# Patient Record
Sex: FEMALE | Race: OTHER | NOT HISPANIC OR LATINO | URBAN - METROPOLITAN AREA
[De-identification: names, ages, dates, MRNs, and addresses within clinical notes are randomized per-mention and may not be internally consistent; named-entity substitution may affect disease eponyms.]

---

## 2017-11-08 PROBLEM — Z00.00 ENCOUNTER FOR PREVENTIVE HEALTH EXAMINATION: Status: ACTIVE | Noted: 2017-11-08

## 2019-02-01 ENCOUNTER — OUTPATIENT (OUTPATIENT)
Dept: OUTPATIENT SERVICES | Facility: HOSPITAL | Age: 74
LOS: 1 days | End: 2019-02-01
Payer: COMMERCIAL

## 2019-02-01 ENCOUNTER — APPOINTMENT (OUTPATIENT)
Dept: ULTRASOUND IMAGING | Facility: HOSPITAL | Age: 74
End: 2019-02-01

## 2019-02-01 DIAGNOSIS — R10.11 RIGHT UPPER QUADRANT PAIN: ICD-10-CM

## 2019-02-01 PROCEDURE — 76700 US EXAM ABDOM COMPLETE: CPT

## 2019-02-01 PROCEDURE — 76700 US EXAM ABDOM COMPLETE: CPT | Mod: 26

## 2020-05-09 ENCOUNTER — TRANSCRIPTION ENCOUNTER (OUTPATIENT)
Age: 75
End: 2020-05-09

## 2021-02-20 ENCOUNTER — TRANSCRIPTION ENCOUNTER (OUTPATIENT)
Age: 76
End: 2021-02-20

## 2021-02-22 ENCOUNTER — FORM ENCOUNTER (OUTPATIENT)
Age: 76
End: 2021-02-22

## 2021-02-26 ENCOUNTER — EMERGENCY (EMERGENCY)
Facility: HOSPITAL | Age: 76
LOS: 1 days | Discharge: HOME | End: 2021-02-26
Attending: STUDENT IN AN ORGANIZED HEALTH CARE EDUCATION/TRAINING PROGRAM
Payer: COMMERCIAL

## 2021-02-26 VITALS
HEIGHT: 62 IN | OXYGEN SATURATION: 100 % | HEART RATE: 84 BPM | SYSTOLIC BLOOD PRESSURE: 122 MMHG | WEIGHT: 130.07 LBS | TEMPERATURE: 100 F | RESPIRATION RATE: 16 BRPM | DIASTOLIC BLOOD PRESSURE: 75 MMHG

## 2021-02-26 VITALS
RESPIRATION RATE: 16 BRPM | SYSTOLIC BLOOD PRESSURE: 105 MMHG | HEART RATE: 71 BPM | OXYGEN SATURATION: 95 % | TEMPERATURE: 100 F | DIASTOLIC BLOOD PRESSURE: 68 MMHG

## 2021-02-26 LAB
ALBUMIN SERPL ELPH-MCNC: 3.2 G/DL — LOW (ref 3.5–5)
ALP SERPL-CCNC: 47 U/L — SIGNIFICANT CHANGE UP (ref 40–120)
ALT FLD-CCNC: 23 U/L DA — SIGNIFICANT CHANGE UP (ref 10–60)
ANION GAP SERPL CALC-SCNC: 6 MMOL/L — SIGNIFICANT CHANGE UP (ref 5–17)
AST SERPL-CCNC: 16 U/L — SIGNIFICANT CHANGE UP (ref 10–40)
BASOPHILS # BLD AUTO: 0.01 K/UL — SIGNIFICANT CHANGE UP (ref 0–0.2)
BASOPHILS NFR BLD AUTO: 0.2 % — SIGNIFICANT CHANGE UP (ref 0–2)
BILIRUB SERPL-MCNC: 0.3 MG/DL — SIGNIFICANT CHANGE UP (ref 0.2–1.2)
BUN SERPL-MCNC: 6 MG/DL — LOW (ref 7–18)
CALCIUM SERPL-MCNC: 8.5 MG/DL — SIGNIFICANT CHANGE UP (ref 8.4–10.5)
CHLORIDE SERPL-SCNC: 102 MMOL/L — SIGNIFICANT CHANGE UP (ref 96–108)
CO2 SERPL-SCNC: 27 MMOL/L — SIGNIFICANT CHANGE UP (ref 22–31)
CREAT SERPL-MCNC: 0.54 MG/DL — SIGNIFICANT CHANGE UP (ref 0.5–1.3)
EOSINOPHIL # BLD AUTO: 0 K/UL — SIGNIFICANT CHANGE UP (ref 0–0.5)
EOSINOPHIL NFR BLD AUTO: 0 % — SIGNIFICANT CHANGE UP (ref 0–6)
GLUCOSE SERPL-MCNC: 92 MG/DL — SIGNIFICANT CHANGE UP (ref 70–99)
HCT VFR BLD CALC: 37.4 % — SIGNIFICANT CHANGE UP (ref 34.5–45)
HGB BLD-MCNC: 11.9 G/DL — SIGNIFICANT CHANGE UP (ref 11.5–15.5)
IMM GRANULOCYTES NFR BLD AUTO: 0.2 % — SIGNIFICANT CHANGE UP (ref 0–1.5)
LYMPHOCYTES # BLD AUTO: 1.02 K/UL — SIGNIFICANT CHANGE UP (ref 1–3.3)
LYMPHOCYTES # BLD AUTO: 18.5 % — SIGNIFICANT CHANGE UP (ref 13–44)
MAGNESIUM SERPL-MCNC: 2.2 MG/DL — SIGNIFICANT CHANGE UP (ref 1.6–2.6)
MCHC RBC-ENTMCNC: 27 PG — SIGNIFICANT CHANGE UP (ref 27–34)
MCHC RBC-ENTMCNC: 31.8 GM/DL — LOW (ref 32–36)
MCV RBC AUTO: 84.8 FL — SIGNIFICANT CHANGE UP (ref 80–100)
MONOCYTES # BLD AUTO: 0.36 K/UL — SIGNIFICANT CHANGE UP (ref 0–0.9)
MONOCYTES NFR BLD AUTO: 6.5 % — SIGNIFICANT CHANGE UP (ref 2–14)
NEUTROPHILS # BLD AUTO: 4.1 K/UL — SIGNIFICANT CHANGE UP (ref 1.8–7.4)
NEUTROPHILS NFR BLD AUTO: 74.6 % — SIGNIFICANT CHANGE UP (ref 43–77)
NRBC # BLD: 0 /100 WBCS — SIGNIFICANT CHANGE UP (ref 0–0)
PLATELET # BLD AUTO: 142 K/UL — LOW (ref 150–400)
POTASSIUM SERPL-MCNC: 3.7 MMOL/L — SIGNIFICANT CHANGE UP (ref 3.5–5.3)
POTASSIUM SERPL-SCNC: 3.7 MMOL/L — SIGNIFICANT CHANGE UP (ref 3.5–5.3)
PROT SERPL-MCNC: 6.7 G/DL — SIGNIFICANT CHANGE UP (ref 6–8.3)
RBC # BLD: 4.41 M/UL — SIGNIFICANT CHANGE UP (ref 3.8–5.2)
RBC # FLD: 14.1 % — SIGNIFICANT CHANGE UP (ref 10.3–14.5)
SODIUM SERPL-SCNC: 135 MMOL/L — SIGNIFICANT CHANGE UP (ref 135–145)
TROPONIN I SERPL-MCNC: <0.015 NG/ML — SIGNIFICANT CHANGE UP (ref 0–0.04)
WBC # BLD: 5.5 K/UL — SIGNIFICANT CHANGE UP (ref 3.8–10.5)
WBC # FLD AUTO: 5.5 K/UL — SIGNIFICANT CHANGE UP (ref 3.8–10.5)

## 2021-02-26 PROCEDURE — 96374 THER/PROPH/DIAG INJ IV PUSH: CPT

## 2021-02-26 PROCEDURE — 99284 EMERGENCY DEPT VISIT MOD MDM: CPT

## 2021-02-26 PROCEDURE — 80053 COMPREHEN METABOLIC PANEL: CPT

## 2021-02-26 PROCEDURE — 83735 ASSAY OF MAGNESIUM: CPT

## 2021-02-26 PROCEDURE — 96361 HYDRATE IV INFUSION ADD-ON: CPT

## 2021-02-26 PROCEDURE — 99284 EMERGENCY DEPT VISIT MOD MDM: CPT | Mod: 25

## 2021-02-26 PROCEDURE — 85025 COMPLETE CBC W/AUTO DIFF WBC: CPT

## 2021-02-26 PROCEDURE — 36415 COLL VENOUS BLD VENIPUNCTURE: CPT

## 2021-02-26 PROCEDURE — 84484 ASSAY OF TROPONIN QUANT: CPT

## 2021-02-26 RX ORDER — ONDANSETRON 8 MG/1
4 TABLET, FILM COATED ORAL ONCE
Refills: 0 | Status: COMPLETED | OUTPATIENT
Start: 2021-02-26 | End: 2021-02-26

## 2021-02-26 RX ORDER — ACETAMINOPHEN 500 MG
975 TABLET ORAL ONCE
Refills: 0 | Status: COMPLETED | OUTPATIENT
Start: 2021-02-26 | End: 2021-02-26

## 2021-02-26 RX ORDER — SODIUM CHLORIDE 9 MG/ML
1000 INJECTION INTRAMUSCULAR; INTRAVENOUS; SUBCUTANEOUS ONCE
Refills: 0 | Status: COMPLETED | OUTPATIENT
Start: 2021-02-26 | End: 2021-02-26

## 2021-02-26 RX ORDER — IBUPROFEN 200 MG
600 TABLET ORAL ONCE
Refills: 0 | Status: COMPLETED | OUTPATIENT
Start: 2021-02-26 | End: 2021-02-26

## 2021-02-26 RX ADMIN — Medication 600 MILLIGRAM(S): at 14:51

## 2021-02-26 RX ADMIN — ONDANSETRON 4 MILLIGRAM(S): 8 TABLET, FILM COATED ORAL at 13:18

## 2021-02-26 RX ADMIN — SODIUM CHLORIDE 1000 MILLILITER(S): 9 INJECTION INTRAMUSCULAR; INTRAVENOUS; SUBCUTANEOUS at 11:56

## 2021-02-26 RX ADMIN — SODIUM CHLORIDE 1000 MILLILITER(S): 9 INJECTION INTRAMUSCULAR; INTRAVENOUS; SUBCUTANEOUS at 13:00

## 2021-02-26 RX ADMIN — Medication 975 MILLIGRAM(S): at 11:56

## 2021-02-26 NOTE — ED PROVIDER NOTE - CLINICAL SUMMARY MEDICAL DECISION MAKING FREE TEXT BOX
75F presenting with fever, decreased PO and fatigue. no chest pain or shortness of breath. day 8 of covid symptoms. 96% at home and 94% on RA in ED. will get labs, symptom control. will reassess.

## 2021-02-26 NOTE — ED PROVIDER NOTE - CROS ED CARDIOVAS ALL NEG
Tammy,I feel bad I haven't caught up with you yet on [address] and thank you for meeting with her and father last week. Is there anything I can do further in your opinion? Thanks,Geovany negative...

## 2021-02-26 NOTE — ED ADULT TRIAGE NOTE - CHIEF COMPLAINT QUOTE
patient tested positive for COVID-19 and complaining of generalized weakness and nausea. patient was found by EMS with 94% RA and was placed on 4L NC. no sob no cp

## 2021-02-26 NOTE — ED ADULT NURSE NOTE - ED STAT RN HANDOFF DETAILS
discharged home, stable, in no acute distress. IV removed no bleeding, no redness noted. O2 sat 94% on room air

## 2021-02-26 NOTE — ED PROVIDER NOTE - PROGRESS NOTE DETAILS
patient and family updated on results. patient reports symptoms improved. left message for patients pcp. will discharge. Alfredo Mccloud

## 2021-02-26 NOTE — ED PROVIDER NOTE - PATIENT PORTAL LINK FT
You can access the FollowMyHealth Patient Portal offered by [facility] by registering at the following website: [email or web address]. By joining [facility]’s FollowMyHealth portal, you will also be able to view your health information using other applications (apps) compatible with our system.

## 2021-02-26 NOTE — ED ADULT NURSE NOTE - OBJECTIVE STATEMENT
Patient BIBA as per EMS 94% on room air. c/o generalized weakness, generalized pain with nausea. Patient tested positive for COVID-19 2/19. Patient denies chest pain, no sob on 4L nasal cannula. Patient reports feeling dizzy and falling down 2xs, with loss of consciousness for 2-3 minutes.

## 2021-02-26 NOTE — ED PROVIDER NOTE - NSFOLLOWUPINSTRUCTIONS_ED_ALL_ED_FT
You were seen in the emergency department for fever and fatigue likely caused by Covid-19.     Please follow-up with your primary care doctor in the next 24-48 hours.     Please continue to drink plenty of fluids.     Please take Tylenol and Ibuprofen as prescribed on the bottles for pain control.     If you have any worsening symptoms, severe chest pain, shortness of breath, headache, or oxygen level 91% or lower, please return to the emergency department.

## 2021-02-26 NOTE — ED PROVIDER NOTE - OBJECTIVE STATEMENT
75F, pmh of htn, hld, presenting with weakness. patient reports she was diagnosed with covid-19 and has had 8 days of symptoms. has fever, nausea, and fatigue. no chest pain, shortness of breathing, abdominal pain, pain or swelling of lower extremities. sating 96% at home.

## 2021-02-26 NOTE — ED PROVIDER NOTE - PHYSICAL EXAMINATION
General: uncomfortable appearing female, no acute distress   HEENT: normocephalic, atraumatic   Respiratory: normal work of breathing  MSK: no swelling or tenderness of lower extremities, moving all extremities spontaneously   Skin: warm, dry   Neuro: A&Ox3  Psych: appropriate affect

## 2021-03-13 ENCOUNTER — TRANSCRIPTION ENCOUNTER (OUTPATIENT)
Age: 76
End: 2021-03-13

## 2021-05-06 ENCOUNTER — TRANSCRIPTION ENCOUNTER (OUTPATIENT)
Age: 76
End: 2021-05-06

## 2021-05-18 NOTE — ED ADULT NURSE NOTE - NS TRANSFER PATIENT BELONGINGS
Patient seen and examined at bedside, resting comfortably. Pain well controlled. Denies headache/dizziness/lightheadedness, chest pain, dyspnea, n/v, numbness/tingling. No complaints at this time. No overnight events.    Vital Signs Last 24 Hrs  T(C): 36.6 (17 May 2021 19:44), Max: 36.7 (17 May 2021 07:45)  T(F): 97.9 (17 May 2021 19:44), Max: 98.1 (17 May 2021 07:45)  HR: 89 (17 May 2021 19:44) (83 - 89)  BP: 106/60 (17 May 2021 19:44) (106/60 - 108/78)  BP(mean): --  RR: 18 (17 May 2021 19:44) (18 - 18)  SpO2: 96% (17 May 2021 19:44) (96% - 98%)      PHYSICAL EXAM:  General: No acute distress, AAOx3    Left Lower Extremity:  Dressing clean dry intact  +EHL/FHL/TA/GS  SILT L3-S1  +DP/PT Pulses  Compartments soft  No calf TTP B/L  Clothing

## 2021-12-01 ENCOUNTER — TRANSCRIPTION ENCOUNTER (OUTPATIENT)
Age: 76
End: 2021-12-01

## 2022-01-11 ENCOUNTER — TRANSCRIPTION ENCOUNTER (OUTPATIENT)
Age: 77
End: 2022-01-11

## 2022-05-25 ENCOUNTER — NON-APPOINTMENT (OUTPATIENT)
Age: 77
End: 2022-05-25

## 2022-08-20 ENCOUNTER — INPATIENT (INPATIENT)
Facility: HOSPITAL | Age: 77
LOS: 1 days | Discharge: HOME | DRG: 313 | End: 2022-08-22
Attending: GENERAL PRACTICE | Admitting: GENERAL PRACTICE
Payer: COMMERCIAL

## 2022-08-20 VITALS
SYSTOLIC BLOOD PRESSURE: 119 MMHG | OXYGEN SATURATION: 98 % | RESPIRATION RATE: 16 BRPM | WEIGHT: 130.07 LBS | HEART RATE: 78 BPM | HEIGHT: 62 IN | TEMPERATURE: 98 F | DIASTOLIC BLOOD PRESSURE: 80 MMHG

## 2022-08-20 DIAGNOSIS — I25.2 OLD MYOCARDIAL INFARCTION: ICD-10-CM

## 2022-08-20 DIAGNOSIS — I10 ESSENTIAL (PRIMARY) HYPERTENSION: ICD-10-CM

## 2022-08-20 DIAGNOSIS — E89.2 POSTPROCEDURAL HYPOPARATHYROIDISM: Chronic | ICD-10-CM

## 2022-08-20 DIAGNOSIS — I24.9 ACUTE ISCHEMIC HEART DISEASE, UNSPECIFIED: ICD-10-CM

## 2022-08-20 DIAGNOSIS — E78.5 HYPERLIPIDEMIA, UNSPECIFIED: ICD-10-CM

## 2022-08-20 DIAGNOSIS — Z29.9 ENCOUNTER FOR PROPHYLACTIC MEASURES, UNSPECIFIED: ICD-10-CM

## 2022-08-20 DIAGNOSIS — R07.9 CHEST PAIN, UNSPECIFIED: ICD-10-CM

## 2022-08-20 LAB
ALBUMIN SERPL ELPH-MCNC: 3.6 G/DL — SIGNIFICANT CHANGE UP (ref 3.5–5)
ALP SERPL-CCNC: 57 U/L — SIGNIFICANT CHANGE UP (ref 40–120)
ALT FLD-CCNC: 21 U/L DA — SIGNIFICANT CHANGE UP (ref 10–60)
ANION GAP SERPL CALC-SCNC: 7 MMOL/L — SIGNIFICANT CHANGE UP (ref 5–17)
APTT BLD: 29.6 SEC — SIGNIFICANT CHANGE UP (ref 27.5–35.5)
AST SERPL-CCNC: 31 U/L — SIGNIFICANT CHANGE UP (ref 10–40)
BASOPHILS # BLD AUTO: 0.04 K/UL — SIGNIFICANT CHANGE UP (ref 0–0.2)
BASOPHILS NFR BLD AUTO: 0.7 % — SIGNIFICANT CHANGE UP (ref 0–2)
BILIRUB SERPL-MCNC: 0.4 MG/DL — SIGNIFICANT CHANGE UP (ref 0.2–1.2)
BUN SERPL-MCNC: 10 MG/DL — SIGNIFICANT CHANGE UP (ref 7–18)
CALCIUM SERPL-MCNC: 9 MG/DL — SIGNIFICANT CHANGE UP (ref 8.4–10.5)
CHLORIDE SERPL-SCNC: 108 MMOL/L — SIGNIFICANT CHANGE UP (ref 96–108)
CK SERPL-CCNC: 109 U/L — SIGNIFICANT CHANGE UP (ref 21–215)
CO2 SERPL-SCNC: 25 MMOL/L — SIGNIFICANT CHANGE UP (ref 22–31)
CREAT SERPL-MCNC: 0.62 MG/DL — SIGNIFICANT CHANGE UP (ref 0.5–1.3)
EGFR: 93 ML/MIN/1.73M2 — SIGNIFICANT CHANGE UP
EOSINOPHIL # BLD AUTO: 0.03 K/UL — SIGNIFICANT CHANGE UP (ref 0–0.5)
EOSINOPHIL NFR BLD AUTO: 0.5 % — SIGNIFICANT CHANGE UP (ref 0–6)
GLUCOSE SERPL-MCNC: 89 MG/DL — SIGNIFICANT CHANGE UP (ref 70–99)
HCT VFR BLD CALC: 40.6 % — SIGNIFICANT CHANGE UP (ref 34.5–45)
HGB BLD-MCNC: 12.9 G/DL — SIGNIFICANT CHANGE UP (ref 11.5–15.5)
IMM GRANULOCYTES NFR BLD AUTO: 0.3 % — SIGNIFICANT CHANGE UP (ref 0–1.5)
INR BLD: 0.97 RATIO — SIGNIFICANT CHANGE UP (ref 0.88–1.16)
LYMPHOCYTES # BLD AUTO: 1.12 K/UL — SIGNIFICANT CHANGE UP (ref 1–3.3)
LYMPHOCYTES # BLD AUTO: 18.9 % — SIGNIFICANT CHANGE UP (ref 13–44)
MCHC RBC-ENTMCNC: 28.4 PG — SIGNIFICANT CHANGE UP (ref 27–34)
MCHC RBC-ENTMCNC: 31.8 GM/DL — LOW (ref 32–36)
MCV RBC AUTO: 89.4 FL — SIGNIFICANT CHANGE UP (ref 80–100)
MONOCYTES # BLD AUTO: 0.3 K/UL — SIGNIFICANT CHANGE UP (ref 0–0.9)
MONOCYTES NFR BLD AUTO: 5.1 % — SIGNIFICANT CHANGE UP (ref 2–14)
NEUTROPHILS # BLD AUTO: 4.41 K/UL — SIGNIFICANT CHANGE UP (ref 1.8–7.4)
NEUTROPHILS NFR BLD AUTO: 74.5 % — SIGNIFICANT CHANGE UP (ref 43–77)
NRBC # BLD: 0 /100 WBCS — SIGNIFICANT CHANGE UP (ref 0–0)
NT-PROBNP SERPL-SCNC: 113 PG/ML — SIGNIFICANT CHANGE UP (ref 0–450)
PLATELET # BLD AUTO: 180 K/UL — SIGNIFICANT CHANGE UP (ref 150–400)
POTASSIUM SERPL-MCNC: 4.8 MMOL/L — SIGNIFICANT CHANGE UP (ref 3.5–5.3)
POTASSIUM SERPL-SCNC: 4.8 MMOL/L — SIGNIFICANT CHANGE UP (ref 3.5–5.3)
PROT SERPL-MCNC: 6.9 G/DL — SIGNIFICANT CHANGE UP (ref 6–8.3)
PROTHROM AB SERPL-ACNC: 11.5 SEC — SIGNIFICANT CHANGE UP (ref 10.5–13.4)
RBC # BLD: 4.54 M/UL — SIGNIFICANT CHANGE UP (ref 3.8–5.2)
RBC # FLD: 14 % — SIGNIFICANT CHANGE UP (ref 10.3–14.5)
SARS-COV-2 RNA SPEC QL NAA+PROBE: SIGNIFICANT CHANGE UP
SODIUM SERPL-SCNC: 140 MMOL/L — SIGNIFICANT CHANGE UP (ref 135–145)
TROPONIN I, HIGH SENSITIVITY RESULT: 4.1 NG/L — SIGNIFICANT CHANGE UP
TROPONIN I, HIGH SENSITIVITY RESULT: 4.2 NG/L — SIGNIFICANT CHANGE UP
WBC # BLD: 5.92 K/UL — SIGNIFICANT CHANGE UP (ref 3.8–10.5)
WBC # FLD AUTO: 5.92 K/UL — SIGNIFICANT CHANGE UP (ref 3.8–10.5)

## 2022-08-20 PROCEDURE — 99285 EMERGENCY DEPT VISIT HI MDM: CPT

## 2022-08-20 PROCEDURE — 71046 X-RAY EXAM CHEST 2 VIEWS: CPT | Mod: 26

## 2022-08-20 RX ORDER — METOPROLOL TARTRATE 50 MG
25 TABLET ORAL
Refills: 0 | Status: DISCONTINUED | OUTPATIENT
Start: 2022-08-20 | End: 2022-08-22

## 2022-08-20 RX ORDER — NEOMYCIN/POLYMYXIN B/DEXAMETHA 0.1 %
0 SUSPENSION, DROPS(FINAL DOSAGE FORM)(ML) OPHTHALMIC (EYE)
Qty: 0 | Refills: 0 | DISCHARGE

## 2022-08-20 RX ORDER — ATORVASTATIN CALCIUM 80 MG/1
0 TABLET, FILM COATED ORAL
Qty: 0 | Refills: 0 | DISCHARGE

## 2022-08-20 RX ORDER — LANOLIN ALCOHOL/MO/W.PET/CERES
3 CREAM (GRAM) TOPICAL AT BEDTIME
Refills: 0 | Status: DISCONTINUED | OUTPATIENT
Start: 2022-08-20 | End: 2022-08-22

## 2022-08-20 RX ORDER — NEBIVOLOL HYDROCHLORIDE 5 MG/1
0.5 TABLET ORAL
Qty: 0 | Refills: 0 | DISCHARGE

## 2022-08-20 RX ORDER — AMLODIPINE BESYLATE 2.5 MG/1
1 TABLET ORAL
Qty: 0 | Refills: 0 | DISCHARGE

## 2022-08-20 RX ORDER — ONDANSETRON 8 MG/1
4 TABLET, FILM COATED ORAL EVERY 8 HOURS
Refills: 0 | Status: DISCONTINUED | OUTPATIENT
Start: 2022-08-20 | End: 2022-08-22

## 2022-08-20 RX ORDER — AMLODIPINE BESYLATE 2.5 MG/1
0 TABLET ORAL
Qty: 0 | Refills: 0 | DISCHARGE

## 2022-08-20 RX ORDER — IBUPROFEN 200 MG
400 TABLET ORAL ONCE
Refills: 0 | Status: COMPLETED | OUTPATIENT
Start: 2022-08-20 | End: 2022-08-20

## 2022-08-20 RX ORDER — NEBIVOLOL HYDROCHLORIDE 5 MG/1
0 TABLET ORAL
Qty: 0 | Refills: 0 | DISCHARGE

## 2022-08-20 RX ORDER — ENOXAPARIN SODIUM 100 MG/ML
40 INJECTION SUBCUTANEOUS EVERY 24 HOURS
Refills: 0 | Status: DISCONTINUED | OUTPATIENT
Start: 2022-08-20 | End: 2022-08-22

## 2022-08-20 RX ORDER — ROSUVASTATIN CALCIUM 5 MG/1
1 TABLET ORAL
Qty: 0 | Refills: 0 | DISCHARGE

## 2022-08-20 RX ORDER — ROSUVASTATIN CALCIUM 5 MG/1
0 TABLET ORAL
Qty: 0 | Refills: 0 | DISCHARGE

## 2022-08-20 RX ORDER — AMLODIPINE BESYLATE 2.5 MG/1
2.5 TABLET ORAL DAILY
Refills: 0 | Status: DISCONTINUED | OUTPATIENT
Start: 2022-08-20 | End: 2022-08-22

## 2022-08-20 RX ORDER — ATORVASTATIN CALCIUM 80 MG/1
20 TABLET, FILM COATED ORAL AT BEDTIME
Refills: 0 | Status: DISCONTINUED | OUTPATIENT
Start: 2022-08-20 | End: 2022-08-22

## 2022-08-20 RX ORDER — ACETAMINOPHEN 500 MG
650 TABLET ORAL EVERY 6 HOURS
Refills: 0 | Status: DISCONTINUED | OUTPATIENT
Start: 2022-08-20 | End: 2022-08-22

## 2022-08-20 RX ORDER — ASPIRIN/CALCIUM CARB/MAGNESIUM 324 MG
81 TABLET ORAL DAILY
Refills: 0 | Status: DISCONTINUED | OUTPATIENT
Start: 2022-08-20 | End: 2022-08-22

## 2022-08-20 RX ORDER — ASPIRIN/CALCIUM CARB/MAGNESIUM 324 MG
81 TABLET ORAL ONCE
Refills: 0 | Status: COMPLETED | OUTPATIENT
Start: 2022-08-20 | End: 2022-08-20

## 2022-08-20 RX ADMIN — ATORVASTATIN CALCIUM 20 MILLIGRAM(S): 80 TABLET, FILM COATED ORAL at 21:50

## 2022-08-20 RX ADMIN — ENOXAPARIN SODIUM 40 MILLIGRAM(S): 100 INJECTION SUBCUTANEOUS at 16:45

## 2022-08-20 RX ADMIN — Medication 400 MILLIGRAM(S): at 16:02

## 2022-08-20 RX ADMIN — Medication 400 MILLIGRAM(S): at 16:46

## 2022-08-20 RX ADMIN — Medication 25 MILLIGRAM(S): at 17:31

## 2022-08-20 RX ADMIN — Medication 81 MILLIGRAM(S): at 17:30

## 2022-08-20 NOTE — ED PROVIDER NOTE - NS_SCRIBENAMERES_ED_ALL_ED_FT
Hospitalist Consultation Progress Note     Patient: Aditi Maddox Date: 5/15/2017   YOB: 1983 Admission Date: 5/12/2017   MRN: [id number] Attending: Lesly Mendez*     Chief Complaint: abdominal pain    SUBJECTIVE  Aditi Maddox is a 33 year old female who was admitted on 5/12/2017 for abdominal pain/fever. We are consulted for asymptomatic bradycardia  The patient had no acute overnight events. Pain controlled. No CP/SOB. No palpitations.    OBJECTIVE  Scheduled Medications   ertapenem 1 g Daily   sertraline 50 mg Nightly     Continuous Infusions   • sodium chloride 0.9% infusion 100 mL/hr at 05/14/17 0834       PHYSICAL EXAM  Vital signs:    Visit Vitals   • /70 ([place] [facility] [place] [facility]   • Pulse 58   • Temp 97.8 °F (36.6 °C) (Oral)   • Resp 16   • Ht 5' 3\" (1.6 m)   • Wt 67.9 kg   • LMP 07/10/2016 (Exact Date)   • SpO2 97%   • Breastfeeding Yes  Comment: pumping   • BMI 26.54 kg/m2       I/Os:    Intake/Output Summary (Last 24 hours) at 05/15/17 1136  Last data filed at 05/15/17 1000   Gross per 24 hour   Intake          1092.35 ml   Output             2600 ml   Net         -1507.65 ml       General:  Alert and cooperative, female.   Cardiovascular:  RRR, no murmur, no B/L LE edema  Respiratory:  CTAB, no increased work of breathing  Neurologic: EOMI. No gross motor or sensory deficits.  Psychiatric:  Appropriate mood/affect.    LAB RESULTS    Recent Labs  Lab 05/15/17  0558 05/13/17  0743   WBC 6.8 11.8*   HCT 37.9 37.4   HGB 12.7 12.0    193       Recent Labs  Lab 05/14/17  0700 05/12/17  1750   SODIUM 146* 138   POTASSIUM 4.0 3.8   CHLORIDE 116* 106   CO2 21 21   GLUCOSE 101* 86   BUN 7 11   CREATININE 0.54 0.61       IMAGING  Reviewed      ASSESSMENT/PLAN  Aidti Maddox is a 33 year old female who was admitted on 5/12/2017:     #Asymptomatic sinus bradycardia  As stated by my partner in consultation yesterday - EKG looks unremarkable, in  regards to QTc, OH interval. Agree that opiates are the main contributing factor to her bradycardia - these have been stopped. Review of the patient's historical heart rate at PCP office visits(2014) reveal that she runs in the 60s at baseline. This lady is young and in relatively decent shape - I am not surprised she has a slower resting HR. Recent heart rate recordings at recent hospitalizations are not accurate as she has had acute medical conditions during these. Recent outpatient notes do not have a HR recorded. Patient's HR will slow at night while she sleeps due to the vagal activity so I will not be surprised to see a HR in the 40's at night. I will check a TSH tomorrow morning and an echo. If these are unremarkable(which I suspect they will be) - there will be no further workup from my standpoint. I will see her tomorrow.    Epifanio Do, DO  Hospitalist    Please contact the attending hospitalist listed in EPIC caring for the patient until 8pm. From 8pm to 7:30 am (night) contact the hospitalist on call at [phone] for any patient care related issues.     Efe Locke)

## 2022-08-20 NOTE — H&P ADULT - ASSESSMENT
Patient is a 77F, with PMHx of HTN, HLD, MI in 2011, and parathyroidectomy in 2018, who comes in with 3 day history of mid chest pain radiating to her left shoulder and left arm. Admitted for ACS ruleout.

## 2022-08-20 NOTE — H&P ADULT - NSICDXPASTMEDICALHX_GEN_ALL_CORE_FT
PAST MEDICAL HISTORY:  History of myocardial infarction     HLD (hyperlipidemia)     HTN (hypertension)

## 2022-08-20 NOTE — ED PROVIDER NOTE - CLINICAL SUMMARY MEDICAL DECISION MAKING FREE TEXT BOX
Patient with chest pain radiating to the left shoulder blades, relieved with nitroglycerin. Will admit for cardiac arrest. Patient with chest pain radiating to the left shoulder blade, relieved with nitroglycerin. Will admit for cardiac workup, presentation concerning for unable angina, acute coronary syndrome.

## 2022-08-20 NOTE — H&P ADULT - PROBLEM SELECTOR PLAN 3
- Home meds Amlodipine 2.5mg, Bystolic 2.5mg  - Bystolic not in formulary -> will continue as equivalent dose = Metoprolol tartrate 25 BID

## 2022-08-20 NOTE — H&P ADULT - NSHPREVIEWOFSYSTEMS_GEN_ALL_CORE
CONSTITUTIONAL: No fever, weight loss, or fatigue  EYES: No eye pain, visual disturbances, or discharge  ENT:  No difficulty hearing, tinnitus, vertigo; No sinus or throat pain  NECK: No pain or stiffness  RESPIRATORY: No cough, wheezing, chills or hemoptysis; No Shortness of Breath  CARDIOVASCULAR: (+)Chest pain. No palpitations, passing out, dizziness, or leg swelling  GASTROINTESTINAL: No abdominal or epigastric pain. No nausea, vomiting, or hematemesis; No diarrhea or constipation. No melena or hematochezia.  GENITOURINARY: No dysuria, frequency, hematuria, or incontinence  NEUROLOGICAL: No memory loss, loss of strength, numbness, or tremors. (+)Headache  SKIN: No itching, burning, rashes, or lesions   LYMPH Nodes: No enlarged glands  ENDOCRINE: No heat or cold intolerance; No hair loss  MUSCULOSKELETAL: No joint pain or swelling; No muscle, back, No extremity pain  PSYCHIATRIC: No depression, anxiety, mood swings, or difficulty sleeping  HEME/LYMPH: No easy bruising, or bleeding gums  ALLERGY AND IMMUNOLOGIC: No hives or eczema

## 2022-08-20 NOTE — ED PROVIDER NOTE - OBJECTIVE STATEMENT
77 year olf female with history of hypertension, coming in with 3 days of substernal chest pain with radiation to the left shoulder. Patient states pain relieved with nitroglycerin. Patient had stress test a year ago. She is unsure of the result, but believes everything was normal. Patient denies cough and fever. EMS gave nitroglycerin spray. NKDA. 77 year olf female with history of hypertension, coming in with 3 days of substernal chest pressure with radiation to the left shoulder. Patient states pain relieved with nitroglycerin. Patient had stress test a year ago. She is unsure of the result, but believes everything was normal. Patient denies cough and fever. EMS gave nitroglycerin spray. NKDA.

## 2022-08-20 NOTE — ED ADULT NURSE NOTE - OBJECTIVE STATEMENT
Patient presents with c/o chest pain on/off x days, no SOB palpitation, dizziness ,back pain, cough noted.

## 2022-08-20 NOTE — ED PROVIDER NOTE - EKG #1 DATE/TIME
[facility] Hospitalist Progress Note    Subjective  mild febrile temperature 99.  Patient referred having nausea headache no double vision.    Objective   No chills or dysuria     I/O's    Intake/Output Summary (Last 24 hours) at 7/26/2021 1454  Last data filed at 7/26/2021 0659  Gross per 24 hour   Intake 1704.2 ml   Output 1200 ml   Net 504.2 ml       Last Recorded Vitals  Vitals with min/max:      Vital Last Value 24 Hour Range   Temperature 97.9 °F (36.6 °C) (07/26/21 1200) Temp  Min: 97.9 °F (36.6 °C)  Max: 99.3 °F (37.4 °C)   Pulse 93 (07/26/21 1100) Pulse  Min: 75  Max: 99   Respiratory (!) 27 (07/26/21 1100) Resp  Min: 20  Max: 29   Non-Invasive  Blood Pressure (!) 192/108 (07/26/21 1100) BP  Min: 136/84  Max: 192/108   Pulse Oximetry 98 % (07/26/21 1100) SpO2  Min: 92 %  Max: 99 %   Arterial   Blood Pressure   No data recorded      Body mass index is 33.27 kg/m².    Physical Exam:  GENERAL:  In no apparent distress  HEENT: normocephalic, atraumatic, pupils reactive to light, conjunctiva is normal. Ear lobes normal, nose normal, throat moist no masses  Neck: Supple no JVD, no thyroid palpable, no bruit  CHEST:  Symmetric on expansion  LUNGS:  with clear breath sounds bilaterally.   CARDIAC:  Regular rate and rhythm.  S1 and S2  ABDOMEN:  Soft, without detectable tenderness.  No sign of distention.  No   rebound or guarding, and no masses palpated.  EXTREMITIES::  Good range of motion of all major joints. no calf tenderness    VASCULAR:  No Edema.  Peripheral pulses normal and equal in all extremities  NEUROLOGIC: AAOX3 no acute distress, normal tone, normal sensitivity, motor force, reflex normal.      Labs     Recent Labs     07/24/21  0544 07/25/21  0259 07/26/21  0213   WBC 10.0 8.2 8.3   RBC 3.53* 3.52* 3.42*   HGB 10.6* 10.4* 10.1*   HCT 32.6* 33.2* 31.1*   * 116* 142   MCV 92.4 94.3 90.9   MCH 30.0 29.5 29.5   MCHC 32.5 31.3* 32.5   NRBCRE 0 0 0         Recent Labs     07/24/21  0544  07/25/21  0259 07/26/21  0213   SODIUM 143 143 145   POTASSIUM 4.1 3.9 3.8   MG 2.2 2.0 2.1   PHOS 1.7* 2.5 2.2*   CO2 22 22 21   ANIONGAP 10 10 14   GLUCOSE 100* 98 99   BUN 13 14 15   CREATININE 0.64 0.56 0.48*   BCRAT 20 25 31*   CALCIUM 8.1* 8.0* 8.1*        Recent Labs   Lab 07/21/21  1704   RAPDTR <0.02   NTPROB 152*        Recent Labs     07/23/21  2101 07/24/21  0544   INR 1.1 1.1   PT 12.4* 12.1*   PTT 22 22        Imaging  CT HEAD WO CONTRAST   Final Result by [facility], [facility] Incoming Radiant Results And Orders (07/25 0747)   1. Parenchymal hemorrhage with mild surrounding edema within the medial right frontal lobe appears similar in size and extent to the previous exam.    2. Mild intraventricular extension of hemorrhage into the right lateral ventricle appears similar to the previous exam. Blood within the left lateral ventricle is not visualized on the current exam.       Electronically Signed by: TERESA GIFFORD M.D.   Signed on: 07/25/2021 03:37 AM      XR ABDOMEN 1 VIEW   Final Result by Dylon Zhang MD (07/24 7460)   1.    NASOGASTRIC TUBE IN THE STOMACH IN GOOD POSITION   2.    LEFT BASILAR OPACITY AND/OR EFFUSION      Electronically Signed by: DYLON ZHANG MD    Signed on: 7/24/2021 4:50 PM          XR CHEST PA OR AP 1 VIEW   Final Result by Nakia Mejia DO (07/24 0905)   Findings/impression: 7:44 AM.  The ET tube is at the mid to lower trachea.    Enteric tube is seen below the hemidiaphragm.  Heart size is stable.    Vessels are mildly cephalized.  Patchy density within the left lung base   may reflect atelectasis.  No pneumothorax is seen.         Electronically Signed by: NAKIA MEJIA D.O.    Signed on: 7/24/2021 9:05 AM          CT HEAD WO CONTRAST   Final Result by Mesfin, [facility] Incoming Radiant Results And Orders (07/24 6154)   Unchanged large right periventricular/parietal parenchymal hemorrhage extending into the right lateral ventricle.       Electronically Signed by: EVELYN LEE M.D.   Signed  on: 07/24/2021 02:47 AM      CT HEAD WO CONTRAST   Final Result by Kely Black MD (07/23 1137)      Unchanged acute intraparenchymal hematoma in the posteromedial right   frontal lobe with intraventricular extension. Unchanged mild right lateral   ventricular dilatation.      The etiology of this spontaneous lobar hemorrhage is uncertain. MRI and   MRA/CTA may be considered for further evaluation.      Electronically Signed by: KELY BLACK MD    Signed on: 7/23/2021 11:37 AM          XR TUBE CHECK ABDOMEN KUB   Final Result by Bill Tucker MD (07/23 7225)   FINDINGS/IMPRESSION:      There is an orogastric tube, the distal end of which is identified in the   projection of the expected location of the gastric antrum.      There is an inferior vena caval filter.  There are degenerative changes of   the thoracolumbar spine.      There is no radiographic evidence of intra-abdominal free air, mass lesion,   free fluid or abnormal calcification.      The visualized intra-abdominal gas pattern appears otherwise unremarkable.      Incidentally noted is apparent left basilar subsegmental atelectasis.      Electronically Signed by: BILL TUCKER MD    Signed on: 7/23/2021 9:28 AM          XR CHEST PA OR AP 1 VIEW   Final Result by Bill Tucker MD (07/23 5095)   FINDINGS/IMPRESSION:      Since the examination of 07/21/2021, there has been the insertion of   endotracheal and nasogastric tubes, the distal ends of which are identified   in the projections of the mid to distal trachea and stomach, respectively.      Also noted has been the apparent interval development of left basilar   subsegmental atelectasis.      There is relative accentuation of the upper lobe pulmonary vascularity,   suggestive of an element of slight residual cardiac decompensation, volume   overload, etc., which appears unchanged.      There are degenerative changes of the thoracic spine.  The heart size is   normal.  There is tortuosity of the  thoracic aorta.      There is a suggestion of slight bilateral hyperaeration, which appears   unchanged.      There is no evidence of alveolar consolidation, pneumothorax or   demonstrable pulmonary parenchymal nodule.      Electronically Signed by: BILL TUCKER MD    Signed on: 7/23/2021 9:24 AM          CT HEAD LEVEL 1   Final Result by Dylon Vargas MD (07/23 0615)   Addendum 1 of 1 by Dylon Vargas MD (07/23 0615)   Findings were discussed with TERESA PALACIOS  at 7/23/2021 6:15 AM CDT      Electronically Signed by: DYLON VARGAS M.D.   Signed on: 07/23/2021 06:15 AM      Final   Large, acute parenchymal hemorrhage centered in the high right frontal lobe measuring up to 5.7 x 3.4 x 3.1 cm.  There is intraventricular extension of hemorrhage into the right lateral and 3rd ventricle.  Right lateral ventricle is mildly dilated.     Midline shift to the left of approximately 6 mm.        Electronically Signed by: DYLON VARGAS M.D.   Signed on: 07/23/2021 05:55 AM      Cath/PV Case   Final Result by Kerline Keller RN (07/22 1940)      FL GUIDANCE WITHOUT REPORT   Final Result by Cardiant Silent  (07/21 1939)      XR CHEST PA OR AP 1 VIEW   Final Result by Magdy Davis MD (07/21 7203)   Heart is borderline enlarged.  Mild central congestion.  Minimal perihilar   opacities.  Curvilinear opacities in the medial left lung base noted.    Correlate with CT chest.      Electronically Signed by: MAGDY DAVIS MD    Signed on: 7/21/2021 5:44 PM          CTA CHEST PULMONARY EMBOLISM W CONTRAST   Final Result by Rm Contreras MD (07/21 0057)      1.   No evidence of pulmonary embolism.      2.   Mild-to-moderate cardiomegaly.      3.   Round opacities in left lung base in a segment of the lung with no   apparent communication to the airways.  These findings may be related to   pulmonary sequestration.  Superimposed infection is not excluded.      4.   Small hiatal hernia.      Electronically Signed by:  20-Aug-2022 10:27 MERRY REED MD    Signed on: 7/21/2021 5:59 PM               Assessment/Plan  Ligia Ruano is a 59 year old female w/PMH Depression who last week underwent RFA of varicose veins of LLE (had RLE done a few months ago w/o complication) and had US on Monday normal but then on Tuesday felt pain and by Tuesday night had intense pain and called vascular who performed US and found extensive DVT and proceeded with Catheter TPA of LLE after which she is admitted to STICU    * Acute deep vein thrombosis (DVT) of femoral vein of left lower extremity (CMS/HCC)  Assessment & Plan  Acute deep vein thrombosis (DVT) of femoral vein of left lower extremity (CMS/HCC)   Sp IVC filter placement  Sp lysis 7/21   Sp LLE venogram 7/22   Was on tpa   Now off tpa due to ICH   dw vascular service  Pt agreed for dc on xarelto yesterday 7/22, cost confirmed w/ sw   7/24 off all blood thinners  7/25 remains off all blood thinners    Brain bleed (CMS/[facility])  Assessment & Plan  Large ICH w/ IVH overnight   CT head reviewed  nsg and neurology following  Stopped all anticoagulation   Repeat CT head today shows stable bleed   7/24 repeat CT head stable findings. nsg following no plan for EVD  More responsive to commands today   keppra for seizure ppx  7/25 repeat CT head reviewed stable bleed, no need for EVD  keppra for seizure ppx;   7.26 CT scan of the chest did not show increase in the bleeding.  Seems a stable.        Antinausea medications of blood pain medication when adjusted by trauma       Team/.  Patient to be transferred to regular floor today          Acute respiratory failure (CMS/[facility])  Assessment & Plan  Acute respiratory failure. Intubated for airway protection  Sedated w/ propfol and fentanyl  7/24 extubated to NC oxygen  Follows commands amanda w/ R side. LUe and LLE moves somewhat to command  Neurology following  7/25 now on room air  7.26 resolved with        Nicotine dependence  Assessment & Plan  Counseling given    Leg  edema, left  Assessment & Plan  LLE swelling/DVT d/t RFA (Radiofrequency Ablation) performed for varicose veins    Dysphagia  Assessment & Plan   failed swallow eval today  Plan for swallow eval in am     Iron deficiency anemia  Assessment & Plan  Hg 11   Monitor     Leukocytosis  Assessment & Plan  Reactive due to ICH    Depression  Assessment & Plan  Continue with antidepressants    Class 1 obesity due to excess calories in adult  Assessment & Plan  Monitor weight as outpatient  Sill discuss later     PLAN FOR TODAY   To have ct of brain today  Patient seems stable  To regular floor      Current Facility-Administered Medications   Medication Dose Route Frequency Provider Last Rate Last Admin   • amLODIPine (NORVASC) tablet 5 mg  5 mg Oral Daily Ryanne Phillip CNP   5 mg at 07/26/21 1054   • docusate sodium (COLACE) 50 MG/5ML liquid 100 mg  100 mg Oral Daily Ryanne Phillip CNP       • cyclobenzaprine (FLEXERIL) tablet 5 mg  5 mg Oral TID PRN Ryanne Phillip CNP       • enoxaparin (LOVENOX) injection 30 mg  30 mg Subcutaneous Q12H Ryanne Phillip CNP   30 mg at 07/26/21 0455   • ondansetron (ZOFRAN) injection 4 mg  4 mg Intravenous Q6H PRN Ryanne Phillip CNP   4 mg at 07/26/21 1246   • hydrALAZINE (APRESOLINE) injection 10 mg  10 mg Intravenous Q6H PRN Ryanne Phillip CNP   10 mg at 07/25/21 2230   • butalbital-APAP-caffeine (FIORICET) -40 MG tablet 1 tablet  1 tablet Oral Q4H PRN Ryanne Phillip CNP   1 tablet at 07/26/21 0552   • metoCLOPramide (REGLAN) injection 5 mg  5 mg Intravenous Q6H PRN Ryanne Phillip CNP   5 mg at 07/25/21 2312   • EPINEPHrine (ADRENALIN) injection 0.3 mg  0.3 mg Intramuscular PRN Ken Macias MD       • sodium chloride 0.9% infusion   Intravenous Continuous FIDE Dobson 100 mL/hr at 07/26/21 0659 Rate Verify at 07/26/21 0659   • levETIRAcetam (KepPRA INJECT) injection 500 mg  500 mg Intravenous 2 times per day Ryanne Phillip CNP   500 mg at 07/26/21 1055    • sodium chloride (PF) 0.9 % injection 2 mL  2 mL Intracatheter 2 times per day Jace Ceja MD   2 mL at 07/26/21 1119   • Potassium Standard Replacement Protocol   Does not apply See Admin Instructions Jace Ceja MD       • Magnesium Standard Replacement Protocol   Does not apply See Admin Instructions Jace Ceja MD       • Phosphorus Standard Replacement Protocol   Does not apply See Admin Instructions Jace Ceja MD       • acetaminophen (TYLENOL) tablet 650 mg  650 mg Oral Q6H PRN Jace Ceja MD   650 mg at 07/26/21 0551   • traMADol (ULTRAM) tablet 50 mg  50 mg Oral Q6H PRN Jace Ceja MD   50 mg at 07/22/21 0912   • docusate sodium-sennosides (SENOKOT S) 50-8.6 MG 2 tablet  2 tablet Oral Daily PRN Jace Ceja MD       • pravastatin (PRAVACHOL) tablet 10 mg  10 mg Oral Daily Jace Ceja MD   10 mg at 07/26/21 1055       DVT Prophylaxis  Current Active Medications for DVT Prophylaxis (From admission, onward)         Stop     enoxaparin (LOVENOX) injection 30 mg  30 mg,   Subcutaneous,   EVERY 12 HOURS      --                 IP CONSULT TO VASCULAR SURGERY  IP CONSULT TO HOSPITALIST  IP CONSULT TO SOCIAL WORK  IP CONSULT TO NEUROSURGERY  IP CONSULT TO PHYSICAL MEDICINE & REHAB  IP CONSULT TO SOCIAL WORK     Discussed with patient, nurse, consultants       Wendy Nunes MD  7/26/2021

## 2022-08-20 NOTE — H&P ADULT - NSHPPHYSICALEXAM_GEN_ALL_CORE
GENERAL: NAD, well-groomed, well-developed  HEAD:  Atraumatic, Normocephalic  EYES: EOMI, PERRLA, conjunctiva and sclera clear  ENMT: No tonsillar erythema, exudates, or enlargement; Moist mucous membranes, Good dentition, No lesions  NECK: Supple, normal appearance, No JVD; Normal thyroid; Trachea midline  NERVOUS SYSTEM:  Alert & Oriented X3,  Motor Strength 5/5 B/L upper and lower extremities, sensation intact  CHEST/LUNG: Lungs clear to auscultation bilaterally, No rales, rhonchi, wheezing   HEART: Regular rate and rhythm; No murmurs, rubs, or gallops  ABDOMEN: Soft, Nontender, Nondistended; Bowel sounds present  EXTREMITIES:  2+ Peripheral Pulses, No clubbing, cyanosis, or edema  LYMPH: No lymphadenopathy noted  SKIN: No rashes or lesions; Good capillary refill Height (cm): 157.5  Weight (kg): 59  BMI (kg/m2): 23.8  Vital Signs Last 24 HrsT(C): 36.6 (08-20-22 @ 16:34)  T(F): 97.8 (08-20-22 @ 16:34), Max: 97.8 (08-20-22 @ 16:34)  HR: 66 (08-20-22 @ 16:34) (66 - 78)  BP: 145/79 (08-20-22 @ 16:34)  RR: 17 (08-20-22 @ 16:34) (16 - 17)  SpO2: 97% (08-20-22 @ 16:34) (97% - 99%)      POCT Blood Glucose.: 92 mg/dL (20 Aug 2022 10:22)    GENERAL: NAD, well-groomed, well-developed  HEAD:  Atraumatic, Normocephalic  EYES: EOMI, PERRLA, conjunctiva and sclera clear  ENMT: No tonsillar erythema, exudates, or enlargement; Moist mucous membranes, Good dentition, No lesions  NECK: Supple, normal appearance, No JVD; Normal thyroid; Trachea midline  NERVOUS SYSTEM:  Alert & Oriented X3,  Motor Strength 5/5 B/L upper and lower extremities, sensation intact  CHEST/LUNG: Lungs clear to auscultation bilaterally, No rales, rhonchi, wheezing   HEART: Regular rate and rhythm; No murmurs, rubs, or gallops  ABDOMEN: Soft, Nontender, Nondistended; Bowel sounds present  EXTREMITIES:  2+ Peripheral Pulses, No clubbing, cyanosis, or edema  LYMPH: No lymphadenopathy noted  SKIN: No rashes or lesions; Good capillary refill

## 2022-08-20 NOTE — H&P ADULT - HISTORY OF PRESENT ILLNESS
Patient is a 77F, with PMHx of HTN, HLD, MI in 2011, and parathyroidectomy in 2018, who comes in with 3 day history of mid chest pain radiating to her left shoulder and left arm. Pain is sharp pain and comes with SOB, nausea, and dizziness. It was relieved by nitroglycerin. Currently, she only has mild chest pain. Patient endorses headache and denies any abdominal pain, joint pain, blood in urine, blood in stool.    Patient had last stress test and echo 1 year ago. She just remembers that her stress test was ok but echo had some abnormal result. Outpatient cardiologist is Dr. Jeff Butts.     Patient is a 77F, with PMHx of HTN, HLD, MI in 2011, and parathyroidectomy in 2018, who comes in with 3 day history of mid chest pain radiating to her left shoulder and left arm. Pain is sharp pain and comes with SOB, nausea, and dizziness. It was relieved by nitroglycerin. Currently, she only has mild chest pain. Patient endorses headache and denies any abdominal pain, joint pain, blood in urine, or blood in stool.    Patient had last stress test and echo 1 year ago. She just remembers that her stress test was ok but echo had some abnormal result. Outpatient cardiologist is Dr. Jeff Butts. Patient is a 77F, with PMHx of HTN, HLD, MI in 2011, and parathyroidectomy in 2018, who comes in with 3 day history of mid chest pain radiating to her left shoulder and left arm. Pain is sharp pain and comes with SOB, nausea, and dizziness. It was relieved by nitroglycerin.   Currently, she only has mild chest pain. Patient endorses headache and denies any abdominal pain, joint pain, blood in urine, or blood in stool.    Patient had last stress test and echo 1 year ago. She just remembers that her stress test was ok but echo had some abnormal result. Outpatient cardiologist is Dr. Jeff Butts.

## 2022-08-20 NOTE — H&P ADULT - PROBLEM SELECTOR PLAN 2
- Patient reports hx of MI in 2011  - Denies any stents or CABG  - Outpatient cardio is Dr. Jeff Butts

## 2022-08-20 NOTE — H&P ADULT - NSHPADDITIONALINFOADULT_GEN_ALL_CORE
Patient seen, examined, and interviewed by me, case discussed with me, chart reviewed, agree with above H/P as reviewed.  See  full note above.  Dr. FLACO Choudhury ([phone])

## 2022-08-21 ENCOUNTER — TRANSCRIPTION ENCOUNTER (OUTPATIENT)
Age: 77
End: 2022-08-21

## 2022-08-21 LAB
A1C WITH ESTIMATED AVERAGE GLUCOSE RESULT: 5.4 % — SIGNIFICANT CHANGE UP (ref 4–5.6)
ANION GAP SERPL CALC-SCNC: 13 MMOL/L — SIGNIFICANT CHANGE UP (ref 5–17)
BUN SERPL-MCNC: 10 MG/DL — SIGNIFICANT CHANGE UP (ref 7–18)
CALCIUM SERPL-MCNC: 9.1 MG/DL — SIGNIFICANT CHANGE UP (ref 8.4–10.5)
CHLORIDE SERPL-SCNC: 108 MMOL/L — SIGNIFICANT CHANGE UP (ref 96–108)
CHOLEST SERPL-MCNC: 191 MG/DL — SIGNIFICANT CHANGE UP
CO2 SERPL-SCNC: 22 MMOL/L — SIGNIFICANT CHANGE UP (ref 22–31)
CREAT SERPL-MCNC: 0.7 MG/DL — SIGNIFICANT CHANGE UP (ref 0.5–1.3)
D DIMER BLD IA.RAPID-MCNC: <150 NG/ML DDU — SIGNIFICANT CHANGE UP
EGFR: 90 ML/MIN/1.73M2 — SIGNIFICANT CHANGE UP
ESTIMATED AVERAGE GLUCOSE: 108 MG/DL — SIGNIFICANT CHANGE UP (ref 68–114)
GLUCOSE SERPL-MCNC: 81 MG/DL — SIGNIFICANT CHANGE UP (ref 70–99)
HCT VFR BLD CALC: 40.1 % — SIGNIFICANT CHANGE UP (ref 34.5–45)
HCV AB S/CO SERPL IA: 0.11 S/CO — SIGNIFICANT CHANGE UP (ref 0–0.99)
HCV AB SERPL-IMP: SIGNIFICANT CHANGE UP
HDLC SERPL-MCNC: 45 MG/DL — LOW
HGB BLD-MCNC: 12.8 G/DL — SIGNIFICANT CHANGE UP (ref 11.5–15.5)
LIPID PNL WITH DIRECT LDL SERPL: 105 MG/DL — HIGH
MAGNESIUM SERPL-MCNC: 2.3 MG/DL — SIGNIFICANT CHANGE UP (ref 1.6–2.6)
MCHC RBC-ENTMCNC: 27.8 PG — SIGNIFICANT CHANGE UP (ref 27–34)
MCHC RBC-ENTMCNC: 31.9 GM/DL — LOW (ref 32–36)
MCV RBC AUTO: 87.2 FL — SIGNIFICANT CHANGE UP (ref 80–100)
NON HDL CHOLESTEROL: 146 MG/DL — HIGH
NRBC # BLD: 0 /100 WBCS — SIGNIFICANT CHANGE UP (ref 0–0)
PHOSPHATE SERPL-MCNC: 3.8 MG/DL — SIGNIFICANT CHANGE UP (ref 2.5–4.5)
PLATELET # BLD AUTO: 196 K/UL — SIGNIFICANT CHANGE UP (ref 150–400)
POTASSIUM SERPL-MCNC: 3.9 MMOL/L — SIGNIFICANT CHANGE UP (ref 3.5–5.3)
POTASSIUM SERPL-SCNC: 3.9 MMOL/L — SIGNIFICANT CHANGE UP (ref 3.5–5.3)
RBC # BLD: 4.6 M/UL — SIGNIFICANT CHANGE UP (ref 3.8–5.2)
RBC # FLD: 14.2 % — SIGNIFICANT CHANGE UP (ref 10.3–14.5)
SODIUM SERPL-SCNC: 143 MMOL/L — SIGNIFICANT CHANGE UP (ref 135–145)
TRIGL SERPL-MCNC: 206 MG/DL — HIGH
WBC # BLD: 5.4 K/UL — SIGNIFICANT CHANGE UP (ref 3.8–10.5)
WBC # FLD AUTO: 5.4 K/UL — SIGNIFICANT CHANGE UP (ref 3.8–10.5)

## 2022-08-21 RX ORDER — PANTOPRAZOLE SODIUM 20 MG/1
40 TABLET, DELAYED RELEASE ORAL
Refills: 0 | Status: DISCONTINUED | OUTPATIENT
Start: 2022-08-21 | End: 2022-08-22

## 2022-08-21 RX ADMIN — AMLODIPINE BESYLATE 2.5 MILLIGRAM(S): 2.5 TABLET ORAL at 05:35

## 2022-08-21 RX ADMIN — Medication 25 MILLIGRAM(S): at 17:08

## 2022-08-21 RX ADMIN — Medication 25 MILLIGRAM(S): at 05:34

## 2022-08-21 RX ADMIN — Medication 81 MILLIGRAM(S): at 11:09

## 2022-08-21 RX ADMIN — ATORVASTATIN CALCIUM 20 MILLIGRAM(S): 80 TABLET, FILM COATED ORAL at 21:51

## 2022-08-21 NOTE — DISCHARGE NOTE PROVIDER - NSDCCAREPROVSEEN_GEN_ALL_CORE_FT
Macey, Isabella Morrison, Bran Givens, Nickyhao Isabella Choudhury  [facility]  Ordering Physician  Efe Locke      [ Greater than 35 min spent for discharge services.   FLACO Choudhury ]

## 2022-08-21 NOTE — DISCHARGE NOTE PROVIDER - HOSPITAL COURSE
77F, with PMHx of HTN, HLD, MI in 2011, and parathyroidectomy in 2018, who comes in with 3 day history of mid chest pain radiating to her left shoulder and left arm. Pain is sharp pain and comes with SOB, nausea, and dizziness. It was relieved by nitroglycerin. Currently, she only has mild chest pain. Patient endorses headache and denies any abdominal pain, joint pain, blood in urine, or blood in stool.  Patient had last stress test and echo 1 year ago. She just remembers that her stress test was ok but echo had some abnormal result. Outpatient cardiologist is Dr. Jeff Butts.  Admitted for ACS rule out. Trop x2 neg. No events on telemonitoring . ECHO showed.......Stress test showed....... 77F, with PMHx of HTN, HLD, MI in 2011, and parathyroidectomy in 2018, who comes in with 3 day history of mid chest pain radiating to her left shoulder and left arm. Pain is sharp pain and comes with SOB, nausea, and dizziness. It was relieved by nitroglycerin. Currently, she only has mild chest pain. Patient endorses headache and denies any abdominal pain, joint pain, blood in urine, or blood in stool.  Patient had last stress test and echo 1 year ago. She just remembers that her stress test was ok but echo had some abnormal result. Outpatient cardiologist is Dr. Jeff Butts.  Admitted for ACS rule out. Trop x2 neg. No events on telemonitoring . ECHO showed Normal ejection fraction with mild pulmonary hypertension. Stress test  was negative for ischemic changes.     Pt is stable for discharge. Pt has been advised to follow up as outpatient. Case has tiago discussed with the attending. This is just a summary of the case. For further information please refer to pt. chart document.

## 2022-08-21 NOTE — DISCHARGE NOTE PROVIDER - NSDCCPCAREPLAN_GEN_ALL_CORE_FT
PRINCIPAL DISCHARGE DIAGNOSIS  Diagnosis: ACS (acute coronary syndrome)  Assessment and Plan of Treatment: You presented to the hospital with complains of chest pain. You Cardiac workup including Troponins, Echocardiogram and Stress test was negative for ischemia (heart attack). You were started on Medication while in the hospital (aspirin, statin, betablocker). YOu will continue to take, aspirin, rosuvastatin, and bystolic. You should follow up with your PCP within 1 week of discharge for monitoring and medication adjustment.      SECONDARY DISCHARGE DIAGNOSES  Diagnosis: HTN (hypertension)  Assessment and Plan of Treatment: You have a history of hypertension and take Bystolic and amlodipine for it. Your blood pressure was controlled during your admission. You should continue to take your medications as prescribed. You should follow a DASH (Dietary Approaches to Stop Hypertension) diet. The DASH diet encourages you to reduce the sodium in your diet and eat a variety of foods rich in nutrients that help lower blood pressure, such as potassium, calcium and magnesium. You should try to excersise at least 150 minutes per week. You should follow up with your PCP within 1 week of discharge for monitoring and medication adjustment.    Diagnosis: HLD (hyperlipidemia)  Assessment and Plan of Treatment: You have a history of hyperlipidemia and take medication for it. You should continue to take Rosuvastatin as prescribed. You should follow a healthy diet low in saturated fats. You should try to excersise at least 150 minutes per week. You should follow up with your PCP within 1 week of discharge for monitoring and medication adjustment.

## 2022-08-21 NOTE — PROGRESS NOTE ADULT - PROBLEM SELECTOR PLAN 1
- Chest pain radiating to arm and shoulder, accompanied by SOB, nausea, and dizziness, relieved by nitroglycerin  - EKG = NSR  -Mild constant chest pain no radiation 8/21   -Started on PPI 8/21  - C/w home Bystolic as metoprolol for now. Started daily aspirin  - Trop negative x2  - F/u Echo  -f/u  stress test on Monday   - Cardio Dr. Morrison consulted

## 2022-08-21 NOTE — PROGRESS NOTE ADULT - SUBJECTIVE AND OBJECTIVE BOX
PGY-3 Progress Note discussed with attending    PAGER #: [[phone]] TILL 5:00 PM  PLEASE CONTACT ON CALL TEAM:  - On Call Team (Please refer to Arnel) FROM 5:00 PM - 8:30PM  - Nightfloat Team FROM 8:30 -7:30 AM      INTERVAL HPI/OVERNIGHT EVENTS: no event overnight. AxO3 . Complained of mild persistent chest pain, no events on tele. Trops neg      REVIEW OF SYSTEMS:  CONSTITUTIONAL: No fever, weight loss, or fatigue  RESPIRATORY: No cough, wheezing, chills or hemoptysis; No shortness of breath  CARDIOVASCULAR: No chest pain, palpitations, dizziness, or leg swelling  GASTROINTESTINAL: No abdominal pain. No nausea, vomiting, or hematemesis; No diarrhea or constipation. No melena or hematochezia.  GENITOURINARY: No dysuria or hematuria, urinary frequency  NEUROLOGICAL: No headaches, memory loss, loss of strength, numbness, or tremors  SKIN: No itching, burning, rashes, or lesions     Vital Signs Last 24 Hrs  T(C): 36.5 (21 Aug 2022 07:38), Max: 37.1 (20 Aug 2022 20:35)  T(F): 97.7 (21 Aug 2022 07:38), Max: 98.8 (20 Aug 2022 20:35)  HR: 53 (21 Aug 2022 07:38) (53 - 82)  BP: 119/77 (21 Aug 2022 07:38) (113/70 - 145/79)  BP(mean): --  RR: 18 (21 Aug 2022 07:38) (16 - 20)  SpO2: 97% (21 Aug 2022 07:38) (96% - 99%)    Parameters below as of 21 Aug 2022 07:38  Patient On (Oxygen Delivery Method): room air        PHYSICAL EXAMINATION:  GENERAL: NAD  HEAD:  Atraumatic, Normocephalic  EYES:  conjunctiva and sclera clear  NECK: Supple, No JVD, Normal thyroid  CHEST/LUNG: Clear to auscultation. Clear to percussion bilaterally; No rales, rhonchi, wheezing, or rubs  HEART: Regular rate and rhythm; No murmurs, rubs, or gallops  ABDOMEN: Soft, Nontender, Nondistended; Bowel sounds present  NERVOUS SYSTEM:  Alert & Oriented X3,    EXTREMITIES:  2+ Peripheral Pulses, No clubbing, cyanosis, or edema                            12.8   5.40  )-----------( 196      ( 21 Aug 2022 05:35 )             40.1     08-21    143  |  108  |  10  ----------------------------<  81  3.9   |  22  |  0.70    Ca    9.1      21 Aug 2022 05:35  Phos  3.8     08-21  Mg     2.3     08-21    TPro  6.9  /  Alb  3.6  /  TBili  0.4  /  DBili  x   /  AST  31  /  ALT  21  /  AlkPhos  57  08-20    LIVER FUNCTIONS - ( 20 Aug 2022 10:59 )  Alb: 3.6 g/dL / Pro: 6.9 g/dL / ALK PHOS: 57 U/L / ALT: 21 U/L DA / AST: 31 U/L / GGT: x           CARDIAC MARKERS ( 20 Aug 2022 10:59 )  x     / x     / 109 U/L / x     / x          PT/INR - ( 20 Aug 2022 10:59 )   PT: 11.5 sec;   INR: 0.97 ratio         PTT - ( 20 Aug 2022 10:59 )  PTT:29.6 sec    CAPILLARY BLOOD GLUCOSE      RADIOLOGY & ADDITIONAL TESTS:

## 2022-08-21 NOTE — DISCHARGE NOTE PROVIDER - NSDCMRMEDTOKEN_GEN_ALL_CORE_FT
amLODIPine 2.5 mg oral tablet: 1 tab(s) orally once a day  Bystolic 5 mg oral tablet: 0.5 tab(s) orally once a day  rosuvastatin 5 mg oral tablet: 1 tab(s) orally once a day   amLODIPine 2.5 mg oral tablet: 1 tab(s) orally once a day  aspirin 81 mg oral tablet, chewable: 1 tab(s) orally once a day  Bystolic 5 mg oral tablet: 0.5 tab(s) orally once a day  rosuvastatin 5 mg oral tablet: 1 tab(s) orally once a day

## 2022-08-21 NOTE — DISCHARGE NOTE PROVIDER - CARE PROVIDER_API CALL
Akanksha Guzman)  Geriatric Medicine; Internal Medicine  [id number] [place] [address]  Phone: [phone]  Fax: [phone]  Follow Up Time:     Elsi Morrison  CARDIOVASCULAR DISEASE  [address], [address]  [place] [address]  Phone: [phone]  Fax: [phone]  Follow Up Time:

## 2022-08-22 ENCOUNTER — TRANSCRIPTION ENCOUNTER (OUTPATIENT)
Age: 77
End: 2022-08-22

## 2022-08-22 VITALS
RESPIRATION RATE: 16 BRPM | HEART RATE: 75 BPM | DIASTOLIC BLOOD PRESSURE: 86 MMHG | SYSTOLIC BLOOD PRESSURE: 130 MMHG | OXYGEN SATURATION: 98 % | TEMPERATURE: 98 F

## 2022-08-22 LAB
ALBUMIN SERPL ELPH-MCNC: 3.5 G/DL — SIGNIFICANT CHANGE UP (ref 3.5–5)
ALP SERPL-CCNC: 60 U/L — SIGNIFICANT CHANGE UP (ref 40–120)
ALT FLD-CCNC: 19 U/L DA — SIGNIFICANT CHANGE UP (ref 10–60)
ANION GAP SERPL CALC-SCNC: 9 MMOL/L — SIGNIFICANT CHANGE UP (ref 5–17)
AST SERPL-CCNC: 13 U/L — SIGNIFICANT CHANGE UP (ref 10–40)
BILIRUB SERPL-MCNC: 0.4 MG/DL — SIGNIFICANT CHANGE UP (ref 0.2–1.2)
BUN SERPL-MCNC: 11 MG/DL — SIGNIFICANT CHANGE UP (ref 7–18)
CALCIUM SERPL-MCNC: 9.4 MG/DL — SIGNIFICANT CHANGE UP (ref 8.4–10.5)
CHLORIDE SERPL-SCNC: 108 MMOL/L — SIGNIFICANT CHANGE UP (ref 96–108)
CO2 SERPL-SCNC: 27 MMOL/L — SIGNIFICANT CHANGE UP (ref 22–31)
CREAT SERPL-MCNC: 0.65 MG/DL — SIGNIFICANT CHANGE UP (ref 0.5–1.3)
EGFR: 92 ML/MIN/1.73M2 — SIGNIFICANT CHANGE UP
GLUCOSE SERPL-MCNC: 94 MG/DL — SIGNIFICANT CHANGE UP (ref 70–99)
HCT VFR BLD CALC: 42.1 % — SIGNIFICANT CHANGE UP (ref 34.5–45)
HGB BLD-MCNC: 13.3 G/DL — SIGNIFICANT CHANGE UP (ref 11.5–15.5)
MCHC RBC-ENTMCNC: 28.1 PG — SIGNIFICANT CHANGE UP (ref 27–34)
MCHC RBC-ENTMCNC: 31.6 GM/DL — LOW (ref 32–36)
MCV RBC AUTO: 89 FL — SIGNIFICANT CHANGE UP (ref 80–100)
NRBC # BLD: 0 /100 WBCS — SIGNIFICANT CHANGE UP (ref 0–0)
PLATELET # BLD AUTO: 185 K/UL — SIGNIFICANT CHANGE UP (ref 150–400)
POTASSIUM SERPL-MCNC: 3.9 MMOL/L — SIGNIFICANT CHANGE UP (ref 3.5–5.3)
POTASSIUM SERPL-SCNC: 3.9 MMOL/L — SIGNIFICANT CHANGE UP (ref 3.5–5.3)
PROT SERPL-MCNC: 6.7 G/DL — SIGNIFICANT CHANGE UP (ref 6–8.3)
RBC # BLD: 4.73 M/UL — SIGNIFICANT CHANGE UP (ref 3.8–5.2)
RBC # FLD: 14 % — SIGNIFICANT CHANGE UP (ref 10.3–14.5)
SODIUM SERPL-SCNC: 144 MMOL/L — SIGNIFICANT CHANGE UP (ref 135–145)
WBC # BLD: 5.28 K/UL — SIGNIFICANT CHANGE UP (ref 3.8–10.5)
WBC # FLD AUTO: 5.28 K/UL — SIGNIFICANT CHANGE UP (ref 3.8–10.5)

## 2022-08-22 PROCEDURE — 84484 ASSAY OF TROPONIN QUANT: CPT

## 2022-08-22 PROCEDURE — 84100 ASSAY OF PHOSPHORUS: CPT

## 2022-08-22 PROCEDURE — 85730 THROMBOPLASTIN TIME PARTIAL: CPT

## 2022-08-22 PROCEDURE — 83880 ASSAY OF NATRIURETIC PEPTIDE: CPT

## 2022-08-22 PROCEDURE — 85027 COMPLETE CBC AUTOMATED: CPT

## 2022-08-22 PROCEDURE — 78452 HT MUSCLE IMAGE SPECT MULT: CPT | Mod: 26

## 2022-08-22 PROCEDURE — 86803 HEPATITIS C AB TEST: CPT

## 2022-08-22 PROCEDURE — 99285 EMERGENCY DEPT VISIT HI MDM: CPT

## 2022-08-22 PROCEDURE — 80061 LIPID PANEL: CPT

## 2022-08-22 PROCEDURE — 85610 PROTHROMBIN TIME: CPT

## 2022-08-22 PROCEDURE — 93017 CV STRESS TEST TRACING ONLY: CPT

## 2022-08-22 PROCEDURE — 80053 COMPREHEN METABOLIC PANEL: CPT

## 2022-08-22 PROCEDURE — 93005 ELECTROCARDIOGRAM TRACING: CPT

## 2022-08-22 PROCEDURE — 82550 ASSAY OF CK (CPK): CPT

## 2022-08-22 PROCEDURE — 71046 X-RAY EXAM CHEST 2 VIEWS: CPT

## 2022-08-22 PROCEDURE — 93018 CV STRESS TEST I&R ONLY: CPT

## 2022-08-22 PROCEDURE — 78452 HT MUSCLE IMAGE SPECT MULT: CPT

## 2022-08-22 PROCEDURE — 93306 TTE W/DOPPLER COMPLETE: CPT

## 2022-08-22 PROCEDURE — 85025 COMPLETE CBC W/AUTO DIFF WBC: CPT

## 2022-08-22 PROCEDURE — A9502: CPT

## 2022-08-22 PROCEDURE — 85379 FIBRIN DEGRADATION QUANT: CPT

## 2022-08-22 PROCEDURE — 93016 CV STRESS TEST SUPVJ ONLY: CPT

## 2022-08-22 PROCEDURE — 82962 GLUCOSE BLOOD TEST: CPT

## 2022-08-22 PROCEDURE — 36415 COLL VENOUS BLD VENIPUNCTURE: CPT

## 2022-08-22 PROCEDURE — 83036 HEMOGLOBIN GLYCOSYLATED A1C: CPT

## 2022-08-22 PROCEDURE — 87635 SARS-COV-2 COVID-19 AMP PRB: CPT

## 2022-08-22 PROCEDURE — 83735 ASSAY OF MAGNESIUM: CPT

## 2022-08-22 PROCEDURE — 80048 BASIC METABOLIC PNL TOTAL CA: CPT

## 2022-08-22 RX ORDER — ASPIRIN/CALCIUM CARB/MAGNESIUM 324 MG
1 TABLET ORAL
Qty: 30 | Refills: 0
Start: 2022-08-22 | End: 2022-09-20

## 2022-08-22 RX ADMIN — PANTOPRAZOLE SODIUM 40 MILLIGRAM(S): 20 TABLET, DELAYED RELEASE ORAL at 05:45

## 2022-08-22 RX ADMIN — Medication 81 MILLIGRAM(S): at 12:29

## 2022-08-22 RX ADMIN — AMLODIPINE BESYLATE 2.5 MILLIGRAM(S): 2.5 TABLET ORAL at 05:45

## 2022-08-22 RX ADMIN — Medication 25 MILLIGRAM(S): at 05:45

## 2022-08-22 NOTE — PROGRESS NOTE ADULT - SUBJECTIVE AND OBJECTIVE BOX
PGY-3 Progress Note discussed with attending    PAGER #: [[phone]] TILL 5:00 PM  PLEASE CONTACT ON CALL TEAM:  - On Call Team (Please refer to Arnel) FROM 5:00 PM - 8:30PM  - Nightfloat Team FROM 8:30 -7:30 AM    CHIEF COMPLAINT & BRIEF HOSPITAL COURSE:      INTERVAL HPI/OVERNIGHT EVENTS:       REVIEW OF SYSTEMS:  CONSTITUTIONAL: No fever, weight loss, or fatigue  RESPIRATORY: No cough, wheezing, chills or hemoptysis; No shortness of breath  CARDIOVASCULAR: No chest pain, palpitations, dizziness, or leg swelling  GASTROINTESTINAL: No abdominal pain. No nausea, vomiting, or hematemesis; No diarrhea or constipation. No melena or hematochezia.  GENITOURINARY: No dysuria or hematuria, urinary frequency  MUSCULOSKELETAL: No pain, no Limited ROM  NEUROLOGICAL: No headaches, memory loss, loss of strength, numbness, or tremors  SKIN: No itching, burning, rashes, or lesions     MEDICATIONS  (STANDING):  amLODIPine   Tablet 2.5 milliGRAM(s) Oral daily  aspirin  chewable 81 milliGRAM(s) Oral daily  atorvastatin 20 milliGRAM(s) Oral at bedtime  enoxaparin Injectable 40 milliGRAM(s) SubCutaneous every 24 hours  metoprolol tartrate 25 milliGRAM(s) Oral two times a day  pantoprazole    Tablet 40 milliGRAM(s) Oral before breakfast    MEDICATIONS  (PRN):  acetaminophen     Tablet .. 650 milliGRAM(s) Oral every 6 hours PRN Temp greater or equal to 38C (100.4F), Mild Pain (1 - 3), Moderate Pain (4 - 6)  aluminum hydroxide/magnesium hydroxide/simethicone Suspension 30 milliLiter(s) Oral every 4 hours PRN Dyspepsia  melatonin 3 milliGRAM(s) Oral at bedtime PRN Insomnia  ondansetron Injectable 4 milliGRAM(s) IV Push every 8 hours PRN Nausea and/or Vomiting      Vital Signs Last 24 Hrs  T(C): 36.5 (22 Aug 2022 07:24), Max: 37.2 (21 Aug 2022 20:24)  T(F): 97.7 (22 Aug 2022 07:24), Max: 98.9 (21 Aug 2022 20:24)  HR: 60 (22 Aug 2022 07:24) (56 - 61)  BP: 117/79 (22 Aug 2022 07:24) (108/71 - 121/64)  BP(mean): --  RR: 16 (22 Aug 2022 07:24) (16 - 18)  SpO2: 98% (22 Aug 2022 07:24) (96% - 98%)    Parameters below as of 22 Aug 2022 07:24  Patient On (Oxygen Delivery Method): room air        PHYSICAL EXAMINATION:  GENERAL: NAD, well built  HEAD:  Atraumatic, Normocephalic  EYES:  conjunctiva and sclera clear, no scleral icterus  NECK: Supple, No JVD, Normal thyroid  CHEST/LUNG: Clear to auscultation.  No rales, rhonchi, wheezing, or rubs  HEART: Regular rate and rhythm; No murmurs, rubs, or gallops  ABDOMEN: Soft, Nontender, Nondistended; Bowel sounds present  NERVOUS SYSTEM:  Alert & Oriented X3,  Strength 5/5 in upper and lower extremities, sensation intact  EXTREMITIES:  2+ Peripheral Pulses, No clubbing, cyanosis, or edema  SKIN: warm dry, no lesions noted                          13.3   5.28  )-----------( 185      ( 22 Aug 2022 05:45 )             42.1     08-22    144  |  108  |  11  ----------------------------<  94  3.9   |  27  |  0.65    Ca    9.4      22 Aug 2022 05:45  Phos  3.8     08-21  Mg     2.3     08-21    TPro  6.7  /  Alb  3.5  /  TBili  0.4  /  DBili  x   /  AST  13  /  ALT  19  /  AlkPhos  60  08-22    LIVER FUNCTIONS - ( 22 Aug 2022 05:45 )  Alb: 3.5 g/dL / Pro: 6.7 g/dL / ALK PHOS: 60 U/L / ALT: 19 U/L DA / AST: 13 U/L / GGT: x           CARDIAC MARKERS ( 20 Aug 2022 10:59 )  x     / x     / 109 U/L / x     / x          PT/INR - ( 20 Aug 2022 10:59 )   PT: 11.5 sec;   INR: 0.97 ratio         PTT - ( 20 Aug 2022 10:59 )  PTT:29.6 sec  I&O's Summary        RADIOLOGY & ADDITIONAL TESTS:                   PGY-3 Progress Note discussed with attending    PAGER #: [[phone]] TILL 5:00 PM  PLEASE CONTACT ON CALL TEAM:  - On Call Team (Please refer to Arnel) FROM 5:00 PM - 8:30PM  - Nightfloat Team FROM 8:30 -7:30 AM      INTERVAL HPI/OVERNIGHT EVENTS:   Patient seen and examined at bedside. No events overnight. Patient going for stress test today. Denies any complains.     REVIEW OF SYSTEMS:  CONSTITUTIONAL: No fever, weight loss, or fatigue  RESPIRATORY: No cough, wheezing, chills or hemoptysis; No shortness of breath  CARDIOVASCULAR: No chest pain, palpitations, dizziness, or leg swelling  GASTROINTESTINAL: No abdominal pain. No nausea, vomiting, or hematemesis; No diarrhea or constipation. No melena or hematochezia.  GENITOURINARY: No dysuria or hematuria, urinary frequency  MUSCULOSKELETAL: No pain, no Limited ROM  NEUROLOGICAL: No headaches, memory loss, loss of strength, numbness, or tremors  SKIN: No itching, burning, rashes, or lesions     MEDICATIONS  (STANDING):  amLODIPine   Tablet 2.5 milliGRAM(s) Oral daily  aspirin  chewable 81 milliGRAM(s) Oral daily  atorvastatin 20 milliGRAM(s) Oral at bedtime  enoxaparin Injectable 40 milliGRAM(s) SubCutaneous every 24 hours  metoprolol tartrate 25 milliGRAM(s) Oral two times a day  pantoprazole    Tablet 40 milliGRAM(s) Oral before breakfast    MEDICATIONS  (PRN):  acetaminophen     Tablet .. 650 milliGRAM(s) Oral every 6 hours PRN Temp greater or equal to 38C (100.4F), Mild Pain (1 - 3), Moderate Pain (4 - 6)  aluminum hydroxide/magnesium hydroxide/simethicone Suspension 30 milliLiter(s) Oral every 4 hours PRN Dyspepsia  melatonin 3 milliGRAM(s) Oral at bedtime PRN Insomnia  ondansetron Injectable 4 milliGRAM(s) IV Push every 8 hours PRN Nausea and/or Vomiting      Vital Signs Last 24 Hrs  T(C): 36.5 (22 Aug 2022 07:24), Max: 37.2 (21 Aug 2022 20:24)  T(F): 97.7 (22 Aug 2022 07:24), Max: 98.9 (21 Aug 2022 20:24)  HR: 60 (22 Aug 2022 07:24) (56 - 61)  BP: 117/79 (22 Aug 2022 07:24) (108/71 - 121/64)  BP(mean): --  RR: 16 (22 Aug 2022 07:24) (16 - 18)  SpO2: 98% (22 Aug 2022 07:24) (96% - 98%)    Parameters below as of 22 Aug 2022 07:24  Patient On (Oxygen Delivery Method): room air        PHYSICAL EXAMINATION:  GENERAL: NAD, well built  HEAD:  Atraumatic, Normocephalic  EYES:  conjunctiva and sclera clear, no scleral icterus  NECK: Supple, No JVD, Normal thyroid  CHEST/LUNG: Clear to auscultation.  No rales, rhonchi, wheezing, or rubs  HEART: Regular rate and rhythm; No murmurs, rubs, or gallops  ABDOMEN: Soft, Nontender, Nondistended; Bowel sounds present  NERVOUS SYSTEM:  Alert & Oriented X3,  Strength 5/5 in upper and lower extremities, sensation intact  EXTREMITIES:  2+ Peripheral Pulses, No clubbing, cyanosis, or edema  SKIN: warm dry, no lesions noted                          13.3   5.28  )-----------( 185      ( 22 Aug 2022 05:45 )             42.1     08-22    144  |  108  |  11  ----------------------------<  94  3.9   |  27  |  0.65    Ca    9.4      22 Aug 2022 05:45  Phos  3.8     08-21  Mg     2.3     08-21    TPro  6.7  /  Alb  3.5  /  TBili  0.4  /  DBili  x   /  AST  13  /  ALT  19  /  AlkPhos  60  08-22    LIVER FUNCTIONS - ( 22 Aug 2022 05:45 )  Alb: 3.5 g/dL / Pro: 6.7 g/dL / ALK PHOS: 60 U/L / ALT: 19 U/L DA / AST: 13 U/L / GGT: x           CARDIAC MARKERS ( 20 Aug 2022 10:59 )  x     / x     / 109 U/L / x     / x          PT/INR - ( 20 Aug 2022 10:59 )   PT: 11.5 sec;   INR: 0.97 ratio         PTT - ( 20 Aug 2022 10:59 )  PTT:29.6 sec  I&O's Summary        RADIOLOGY & ADDITIONAL TESTS:

## 2022-08-22 NOTE — PROGRESS NOTE ADULT - ASSESSMENT
M E D I C A L   A T T E N D I N G    F O L L O W    U P   N O T E  (08-22-22 )                                     ------------------------------------------------------------------------------------------------    patient evaluated by me, case discussed with team, chart, medications, and physical exam reviewed, labs / tests  and vitals reviewed by me, as bellow.   Patient is stable for discharge today. pt overall doing well, chest pain free ... presenting symptoms likely non cardiac and from musculoskeletal pain as discussed with pt..   Patient to follow up with  PMD  See discharge document for full note.  Greater than 35 min spent for these services.                              ( note written / Date of service  08-22-22 )    ==================>> MEDICATIONS <<====================    amLODIPine   Tablet 2.5 milliGRAM(s) Oral daily  aspirin  chewable 81 milliGRAM(s) Oral daily  atorvastatin 20 milliGRAM(s) Oral at bedtime  enoxaparin Injectable 40 milliGRAM(s) SubCutaneous every 24 hours  metoprolol tartrate 25 milliGRAM(s) Oral two times a day  pantoprazole    Tablet 40 milliGRAM(s) Oral before breakfast    MEDICATIONS  (PRN):  acetaminophen     Tablet .. 650 milliGRAM(s) Oral every 6 hours PRN Temp greater or equal to 38C (100.4F), Mild Pain (1 - 3), Moderate Pain (4 - 6)  aluminum hydroxide/magnesium hydroxide/simethicone Suspension 30 milliLiter(s) Oral every 4 hours PRN Dyspepsia  melatonin 3 milliGRAM(s) Oral at bedtime PRN Insomnia  ondansetron Injectable 4 milliGRAM(s) IV Push every 8 hours PRN Nausea and/or Vomiting    ___________  Active diet:  Diet, Regular:   DASH/TLC Sodium & Cholesterol Restricted  ___________________    ==================>> VITAL SIGNS <<==================    T(C): 36.7 (08-22-22 @ 11:14), Max: 37.2 (08-21-22 @ 20:24)  HR: 75 (08-22-22 @ 11:14) (56 - 75)  BP: 130/86 (08-22-22 @ 11:14) (114/73 - 130/86)  BP(mean): --  RR: 16 (08-22-22 @ 11:14) (16 - 16)  SpO2: 98% (08-22-22 @ 11:14) (96% - 98%)     I&O's Summary    22 Aug 2022 07:01  -  22 Aug 2022 14:17  --------------------------------------------------------  IN: 230 mL / OUT: 0 mL / NET: 230 mL       ==================>> LAB AND IMAGING <<==================                        13.3   5.28  )-----------( 185      ( 22 Aug 2022 05:45 )             42.1        08-22    144  |  108  |  11  ----------------------------<  94  3.9   |  27  |  0.65    Ca    9.4      22 Aug 2022 05:45  Phos  3.8     08-21  Mg     2.3     08-21    TPro  6.7  /  Alb  3.5  /  TBili  0.4  /  DBili  x   /  AST  13  /  ALT  19  /  AlkPhos  60  08-22      Lipid profile:  (08-21-22)     Total: 191     LDL  : (p)     HDL  :45     TG   :206     HgA1C:   (08-21-22)          (08-21-22)      5.4  WBC count:   5.28 <<== ,  5.40 <<== ,  5.92 <<==   Hemoglobin:   13.3 <<==,  12.8 <<==,  12.9 <<==  platelets:  185 <==, 196 <==, 180 <==    Creatinine:  0.65  <<==, 0.70  <<==, 0.62  <<==  Sodium:   144  <==, 143  <==, 140  <==       AST:          13 <== , 31 <==      ALT:        19  <== , 21  <==      AP:        60  <=, 57  <=     Bili:        0.4  <=, 0.4  <=     < from: Transthoracic Echocardiogram (08.21.22 @ 06:39) >  CONCLUSIONS:  1. Trace mitral regurgitation.  2. Normal left ventricular internal dimensions and wall  thicknesses.  3. Endocardium not well visualized; grossly normal left  ventricular systolic function.  4. Normal right ventricular size and function.  5. RV systolic pressure is 41 mm Hg. Mild pulmonary  hypertension.    < end of copied text >      < from: Nuclear Stress Test-Pharmacologic (08.22.22 @ 05:17) >  IMPRESSIONS:  * There is a small, predominantly fixed, mild intensity  defect in the mid to basal anteroseptalwall that  thickens, most consistent with attenuation artifact.  * Post-stress resting myocardial perfusion gated SPECT  imaging was performed (LVEF > 70%;LVEDV = 63 ml.)  * Negative study for significant reversible ischemia    < end of copied text >    
  _________________________________________________________________________________________  ========>>  M E D I C A L   A T T E N D I N G    F O L L O W  U P  N O T E  <<=========  -----------------------------------------------------------------------------------------------------    - Patient seen and examined by me approximately sixty minutes ago.  - In summary,  MARIS JEFFERS is a 77y year old woman admitted with CP  - Patient today overall doing ok, comfortable, eating OK.   still with some pain in left chest, mild     ==================>> REVIEW OF SYSTEM <<=================    GEN: no fever, no chills, as above ,, otherwise doing ok   RESP: no SOB, no cough, no sputum  CVS: chest pain as above , no palpitations, no edema  GI: no abdominal pain, no nausea, denies acid reflux   : no dysuria, no frequency, no hematuria  Neuro: no headache, no dizziness  Derm : no itching, no rash    ==================>> PHYSICAL EXAM <<=================    GEN: A&O X 3 , NAD , comfortable, pleasant, calm in chair   HEENT: NCAT, PERRL, MMM, hearing intact  Neck: supple , no JVD appreciated  CVS: S1S2 , regular , No M/R/G appreciated  PULM: CTA B/L,  no W/R/R appreciated  ABD.: soft. non tender, non distended,  bowel sounds present  Extrem: intact pulses , no edema   PSYCH : normal mood,  not anxious                            ( Note Written / Date of service :  08-21-22 )    ==================>> MEDICATIONS <<====================    MEDICATIONS  (STANDING):  amLODIPine   Tablet 2.5 milliGRAM(s) Oral daily  aspirin  chewable 81 milliGRAM(s) Oral daily  atorvastatin 20 milliGRAM(s) Oral at bedtime  enoxaparin Injectable 40 milliGRAM(s) SubCutaneous every 24 hours  metoprolol tartrate 25 milliGRAM(s) Oral two times a day  pantoprazole    Tablet 40 milliGRAM(s) Oral before breakfast    MEDICATIONS  (PRN):  acetaminophen     Tablet .. 650 milliGRAM(s) Oral every 6 hours PRN Temp greater or equal to 38C (100.4F), Mild Pain (1 - 3), Moderate Pain (4 - 6)  aluminum hydroxide/magnesium hydroxide/simethicone Suspension 30 milliLiter(s) Oral every 4 hours PRN Dyspepsia  melatonin 3 milliGRAM(s) Oral at bedtime PRN Insomnia  ondansetron Injectable 4 milliGRAM(s) IV Push every 8 hours PRN Nausea and/or Vomiting    ___________  Active diet:  Diet, Regular:   DASH/TLC Sodium & Cholesterol Restricted  ___________________    ==================>> VITAL SIGNS <<==================    T(C): 36.9 (08-21-22 @ 11:12), Max: 37.1 (08-20-22 @ 20:35)  HR: 58 (08-21-22 @ 11:12) (53 - 82)  BP: 108/71 (08-21-22 @ 11:12) (108/71 - 145/79)  RR: 18 (08-21-22 @ 11:12) (16 - 20)  SpO2: 96% (08-21-22 @ 11:12) (96% - 99%)      ==================>> LAB AND IMAGING <<==================                        12.8   5.40  )-----------( 196      ( 21 Aug 2022 05:35 )             40.1        08-21    143  |  108  |  10  ----------------------------<  81  3.9   |  22  |  0.70    Ca    9.1      21 Aug 2022 05:35  Phos  3.8     08-21  Mg     2.3     08-21    TPro  6.9  /  Alb  3.6  /  TBili  0.4  /  DBili  x   /  AST  31  /  ALT  21  /  AlkPhos  57  08-20    PT/INR - ( 20 Aug 2022 10:59 )   PT: 11.5 sec;   INR: 0.97 ratio    PTT - ( 20 Aug 2022 10:59 )  PTT:29.6 sec              ~~ High Sensitivity Troponin  ~~  4.2  <<--, 4.1  <<--    Pro-BNP: 113 (08-20-22 @ 10:59)       Lipid profile:  (08-21-22)     Total: 191     LDL  : (p)     HDL  :45     TG   :206     pending Echo  ___________________________________________________________________________________  ===============>>  A S S E S S M E N T   A N D   P L A N <<===============  ------------------------------------------------------------------------------------------    · Assessment	  Patient is a 77F, with PMHx of HTN, HLD, MI in 2011, and parathyroidectomy in 2018, who comes in with 3 day history of mid chest pain radiating to her left shoulder and left arm. Admitted for ACS ruleout.    Problem/Plan - 1:  ·  Problem: Chest pain.   ·  Plan: - Chest pain radiating to arm and shoulder, accompanied by SOB, nausea, and dizziness, relieved by nitroglycerin  - C/w home Bystolic as metoprolol for now. Start daily aspirin  - Trop negative x1, f/u trop 2  - F/u Echo, stress test  - Cardio Dr. Morrison consulted.    Problem/Plan - 2:  ·  Problem: History of myocardial infarction.   ·  Plan: - Patient reports hx of MI in 2011  - Denies any stents or CABG  - Outpatient cardio is Dr. Jeff Butts.    Problem/Plan - 3:  ·  Problem: HTN (hypertension).   ·  Plan: - Home meds Amlodipine 2.5mg, Bystolic 2.5mg  - Bystolic not in formulary -> will continue as equivalent dose = Metoprolol tartrate 25 BID.    Problem/Plan - 4:  ·  Problem: HLD (hyperlipidemia).   ·  Plan: - Home med Rosuvastatin 5mg  - C/w home med  - F/u lipid panel, A1c.    Problem/Plan - 5:  ·  Problem: Prophylactic measure.   ·  Plan: - Lovenox.    --------------------------------------------  Case discussed with pt, HS  Education given on findings and plan of care  ___________________________  HRohit Choudhury D.O.  Pager: [phone]    
Patient is a 77F, with PMHx of HTN, HLD, MI in 2011, and parathyroidectomy in 2018, who comes in with 3 day history of mid chest pain radiating to her left shoulder and left arm. Admitted for ACS ruleout.
Patient is a 77F, with PMHx of HTN, HLD, MI in 2011, and parathyroidectomy in 2018, who comes in with 3 day history of mid chest pain radiating to her left shoulder and left arm. Admitted for ACS ruleout.

## 2022-08-22 NOTE — PROGRESS NOTE ADULT - PROBLEM SELECTOR PLAN 4
- Home med Rosuvastatin 5mg  - C/w home med  -   -[id number] - Home med Rosuvastatin 5mg  - C/w home med  -   -hgbA1C 5.4

## 2022-08-22 NOTE — PROGRESS NOTE ADULT - PROBLEM SELECTOR PLAN 1
- Chest pain radiating to arm and shoulder, accompanied by SOB, nausea, and dizziness, relieved by nitroglycerin  - EKG = NSR  -Mild constant chest pain no radiation 8/21   -Started on PPI 8/21  - C/w home Bystolic as metoprolol for now. Started daily aspirin  - Trop negative x2  - F/u Echo  -f/u  stress test on Monday   - Cardio Dr. Morrison consulted - Chest pain radiating to arm and shoulder, accompanied by SOB, nausea, and dizziness, relieved by nitroglycerin  - EKG = NSR  -Mild constant chest pain no radiation 8/21   -Started on PPI 8/21  - C/w home Bystolic as metoprolol for now. Started daily aspirin  - Trop negative x2  - F/u Echo  -f/u  stress test 8/22  - Cardio Dr. Morrison consulted

## 2022-08-22 NOTE — PROGRESS NOTE ADULT - PROBLEM SELECTOR PLAN 2
- Patient reports hx of MI in 2011  - Denies any stents or CABG  - Outpatient cardio is Dr. Jeff Butts
- Patient reports hx of MI in 2011  - Denies any stents or CABG  - Outpatient cardio is Dr. Jeff Butts

## 2022-08-22 NOTE — PROGRESS NOTE ADULT - PROBLEM SELECTOR PLAN 3
- Home meds Amlodipine 2.5mg, Bystolic 2.5mg  - Bystolic not in formulary -> will continue as equivalent dose = Metoprolol tartrate 25 BID
- Home meds Amlodipine 2.5mg, Bystolic 2.5mg  - Bystolic not in formulary -> will continue as equivalent dose = Metoprolol tartrate 25 BID

## 2022-08-22 NOTE — DISCHARGE NOTE NURSING/CASE MANAGEMENT/SOCIAL WORK - PATIENT PORTAL LINK FT
You can access the FollowMyHealth Patient Portal offered by [facility] by registering at the following website: [email or web address]. By joining [facility]’s FollowMyHealth portal, you will also be able to view your health information using other applications (apps) compatible with our system.

## 2022-12-03 ENCOUNTER — NON-APPOINTMENT (OUTPATIENT)
Age: 77
End: 2022-12-03

## 2023-01-03 ENCOUNTER — APPOINTMENT (OUTPATIENT)
Dept: OTOLARYNGOLOGY | Facility: CLINIC | Age: 78
End: 2023-01-03
Payer: COMMERCIAL

## 2023-01-03 DIAGNOSIS — J34.2 DEVIATED NASAL SEPTUM: ICD-10-CM

## 2023-01-03 PROBLEM — I25.2 OLD MYOCARDIAL INFARCTION: Chronic | Status: ACTIVE | Noted: 2022-08-20

## 2023-01-03 PROBLEM — E78.5 HYPERLIPIDEMIA, UNSPECIFIED: Chronic | Status: ACTIVE | Noted: 2022-08-20

## 2023-01-03 PROBLEM — I10 ESSENTIAL (PRIMARY) HYPERTENSION: Chronic | Status: ACTIVE | Noted: 2022-08-20

## 2023-01-03 PROCEDURE — 92557 COMPREHENSIVE HEARING TEST: CPT

## 2023-01-03 PROCEDURE — 31231 NASAL ENDOSCOPY DX: CPT

## 2023-01-03 PROCEDURE — 92567 TYMPANOMETRY: CPT

## 2023-01-03 PROCEDURE — 99204 OFFICE O/P NEW MOD 45 MIN: CPT | Mod: 25

## 2023-01-03 NOTE — HISTORY OF PRESENT ILLNESS
[de-identified] : 10-11 days fullness pain right ear\par no d/c \par no vertigo or tinnitus\par never had before \par + nasal congestion

## 2023-01-03 NOTE — PHYSICAL EXAM
[Midline] : trachea located in midline position [Normal] : no rashes [de-identified] : some thickening of the TM on the right, decreasesed but still + mobility

## 2023-01-03 NOTE — ASSESSMENT
[FreeTextEntry1] : pt with right hearing loss - CHL with B tymp and decrease TM mobility right \par some decrease \par audio test today 1/3/2023\par Discussed with pt she can try decongestants to see if this helps with ear pressure. If decongestants do not provide relief, patient can try low dose steroids. If sx persist, discussed procedure intervention to relieve pressure\par will start on afrin and sudafed. if these do not provide releif will begin medrol \par

## 2023-01-03 NOTE — PROCEDURE
[FreeTextEntry6] : Procedure performed: Nasal Endoscopy- Diagnostic\par Pre-op indication(s): nasal congestion\par Post-op indication(s): nasal congestion \par Verbal and/or written consent obtained from patient\par Anterior rhinoscopy insufficient to account for symptoms\par Scope #: 3,  flexible fiber optic telescope \par The scope was introduced in the nasal passage between the middle and inferior turbinates to exam the inferior portion of the middle meatus and the fontanelle, as well as the maxillary ostia.  Next, the scope was passed medically and posteriorly to the middle turbinates to examine the sphenoethmoid recess and the superior turbinate region.\par Upon visualization the finders are as follows:\par Nasal Septum: left septal deviation\par Bilateral - Mucosa: boggy turbinates, Mucous: scant, Polyp: not seen, Inferior Turbinate: boggy, Middle Turbinate: normal, Superior Turbinate: normal, Inferior Meatus: narrow, Middle Meatus: narrow, Super Meatus:normal, Sphenoethmoidal Recess: clear\par clear NP

## 2023-01-24 ENCOUNTER — APPOINTMENT (OUTPATIENT)
Dept: OTOLARYNGOLOGY | Facility: CLINIC | Age: 78
End: 2023-01-24
Payer: COMMERCIAL

## 2023-01-24 VITALS
DIASTOLIC BLOOD PRESSURE: 86 MMHG | BODY MASS INDEX: 23.19 KG/M2 | HEIGHT: 62 IN | WEIGHT: 126 LBS | HEART RATE: 83 BPM | SYSTOLIC BLOOD PRESSURE: 149 MMHG

## 2023-01-24 DIAGNOSIS — H69.81 OTHER SPECIFIED DISORDERS OF EUSTACHIAN TUBE, RIGHT EAR: ICD-10-CM

## 2023-01-24 DIAGNOSIS — H90.A11 CONDUCTIVE HEARING LOSS, UNILATERAL, RIGHT EAR WITH RESTRICTED HEARING ON THE CONTRALATERAL SIDE: ICD-10-CM

## 2023-01-24 PROCEDURE — 92557 COMPREHENSIVE HEARING TEST: CPT

## 2023-01-24 PROCEDURE — 99214 OFFICE O/P EST MOD 30 MIN: CPT | Mod: 25

## 2023-01-24 PROCEDURE — 92550 TYMPANOMETRY & REFLEX THRESH: CPT

## 2023-01-24 RX ORDER — NEBIVOLOL HYDROCHLORIDE 20 MG/1
TABLET ORAL
Refills: 0 | Status: ACTIVE | COMMUNITY

## 2023-01-24 RX ORDER — AMLODIPINE BESYLATE 5 MG/1
TABLET ORAL
Refills: 0 | Status: ACTIVE | COMMUNITY

## 2023-01-24 RX ORDER — METHYLPREDNISOLONE 4 MG/1
4 TABLET ORAL
Qty: 1 | Refills: 0 | Status: COMPLETED | COMMUNITY
Start: 2023-01-03 | End: 2023-01-24

## 2023-01-24 RX ORDER — CHROMIUM 200 MCG
TABLET ORAL
Refills: 0 | Status: ACTIVE | COMMUNITY

## 2023-01-24 NOTE — HISTORY OF PRESENT ILLNESS
[de-identified] : 77 year old female with history of ear fullness, right otalgia and hearing loss presents for follow up. \par States completed medrol dose pack as prescribed.\par States intermittent fullness and hearing not improved with occasional dizziness. \par Denies otalgia, otorrhea, tinnitus, headaches related to hearing.

## 2023-01-24 NOTE — REASON FOR VISIT
[Subsequent Evaluation] : a subsequent evaluation for [Spouse] : spouse [FreeTextEntry2] : ear fullness and hearing loss.

## 2023-01-24 NOTE — ASSESSMENT
[FreeTextEntry1] : 76 y/o F pt with right hearing loss - CHL with B tymp and decrease TM mobility right last visit - looks improved on exam today but pt still feels hearing not great. got repeat audio to see if tube would benefit her, gap has pretty much closed and has a A tymp c/w exam\par discussed options of hearing treatment at this point pt and  feel not significant limitation in function, discuses what to look for in regards to signs of worsening hearing loss that may require re-evaluation. Also discussed behavioral techniques and environmental controls for improving function . They will follow up as needed or if hearing gets worse.\par

## 2023-11-10 NOTE — PATIENT PROFILE ADULT - NSPRESCRALCFREQ_GEN_A_NUR
Detail Level: Detailed
Quality 110: Preventive Care And Screening: Influenza Immunization: Influenza Immunization Administered during Influenza season
Quality 130: Documentation Of Current Medications In The Medical Record: Current Medications Documented
Never

## 2025-07-02 ENCOUNTER — APPOINTMENT (OUTPATIENT)
Facility: CLINIC | Age: 80
End: 2025-07-02

## 2025-07-02 VITALS
HEART RATE: 73 BPM | HEIGHT: 62 IN | OXYGEN SATURATION: 97 % | BODY MASS INDEX: 23.19 KG/M2 | DIASTOLIC BLOOD PRESSURE: 90 MMHG | SYSTOLIC BLOOD PRESSURE: 141 MMHG | WEIGHT: 126 LBS

## 2025-07-02 PROCEDURE — 99203 OFFICE O/P NEW LOW 30 MIN: CPT

## 2025-07-02 RX ORDER — NAPROXEN 500 MG/1
500 TABLET ORAL
Qty: 30 | Refills: 0 | Status: ACTIVE | COMMUNITY
Start: 2025-07-02 | End: 1900-01-01

## 2025-07-02 RX ORDER — ACETAMINOPHEN ER 650 MG TABLET,EXTENDED RELEASE 650 MG
650 TABLET, EXTENDED RELEASE ORAL 4 TIMES DAILY
Qty: 30 | Refills: 0 | Status: ACTIVE | COMMUNITY
Start: 2025-07-02 | End: 1900-01-01

## 2025-07-03 ENCOUNTER — APPOINTMENT (OUTPATIENT)
Dept: RADIOLOGY | Facility: CLINIC | Age: 80
End: 2025-07-03
Payer: COMMERCIAL

## 2025-07-03 PROCEDURE — 73110 X-RAY EXAM OF WRIST: CPT | Mod: LT

## 2025-07-09 ENCOUNTER — APPOINTMENT (OUTPATIENT)
Facility: CLINIC | Age: 80
End: 2025-07-09

## 2025-07-11 ENCOUNTER — APPOINTMENT (OUTPATIENT)
Facility: CLINIC | Age: 80
End: 2025-07-11
Payer: COMMERCIAL

## 2025-07-11 VITALS
HEIGHT: 62 IN | WEIGHT: 127 LBS | DIASTOLIC BLOOD PRESSURE: 112 MMHG | OXYGEN SATURATION: 98 % | HEART RATE: 69 BPM | SYSTOLIC BLOOD PRESSURE: 160 MMHG | BODY MASS INDEX: 23.37 KG/M2

## 2025-07-11 PROCEDURE — 99213 OFFICE O/P EST LOW 20 MIN: CPT

## 2025-07-11 RX ORDER — METHYLPREDNISOLONE 4 MG/1
4 TABLET ORAL
Qty: 1 | Refills: 0 | Status: DISCONTINUED | COMMUNITY
Start: 2025-07-11 | End: 2025-07-11

## 2025-07-11 RX ORDER — METHYLPREDNISOLONE 4 MG/1
4 TABLET ORAL
Qty: 1 | Refills: 0 | Status: ACTIVE | COMMUNITY
Start: 2025-07-11 | End: 1900-01-01